# Patient Record
Sex: FEMALE | Race: WHITE | NOT HISPANIC OR LATINO | Employment: OTHER | URBAN - METROPOLITAN AREA
[De-identification: names, ages, dates, MRNs, and addresses within clinical notes are randomized per-mention and may not be internally consistent; named-entity substitution may affect disease eponyms.]

---

## 2018-06-28 ENCOUNTER — APPOINTMENT (OUTPATIENT)
Dept: WOMENS IMAGING | Facility: HOSPITAL | Age: 58
End: 2018-06-28

## 2018-06-28 PROCEDURE — 77067 SCR MAMMO BI INCL CAD: CPT | Performed by: RADIOLOGY

## 2018-08-28 ENCOUNTER — RESULTS ENCOUNTER (OUTPATIENT)
Dept: ONCOLOGY | Facility: CLINIC | Age: 58
End: 2018-08-28

## 2018-08-28 ENCOUNTER — CONSULT (OUTPATIENT)
Dept: ONCOLOGY | Facility: CLINIC | Age: 58
End: 2018-08-28

## 2018-08-28 VITALS
WEIGHT: 150 LBS | SYSTOLIC BLOOD PRESSURE: 140 MMHG | HEART RATE: 104 BPM | BODY MASS INDEX: 26.58 KG/M2 | HEIGHT: 63 IN | DIASTOLIC BLOOD PRESSURE: 69 MMHG | RESPIRATION RATE: 16 BRPM | TEMPERATURE: 98.2 F

## 2018-08-28 DIAGNOSIS — C50.912 LOBULAR CARCINOMA OF LEFT BREAST (HCC): Primary | ICD-10-CM

## 2018-08-28 DIAGNOSIS — C50.912 LOBULAR CARCINOMA OF LEFT BREAST (HCC): ICD-10-CM

## 2018-08-28 PROCEDURE — 99245 OFF/OP CONSLTJ NEW/EST HI 55: CPT | Performed by: INTERNAL MEDICINE

## 2018-08-28 RX ORDER — TELMISARTAN 40 MG/1
TABLET ORAL DAILY
COMMUNITY
Start: 2018-06-20

## 2018-08-28 RX ORDER — TRIAMTERENE AND HYDROCHLOROTHIAZIDE 37.5; 25 MG/1; MG/1
CAPSULE ORAL
COMMUNITY
Start: 2018-04-10 | End: 2019-12-19 | Stop reason: ALTCHOICE

## 2018-08-28 RX ORDER — ACYCLOVIR 400 MG/1
1 TABLET ORAL DAILY PRN
COMMUNITY
Start: 2015-02-05

## 2018-08-28 RX ORDER — ACYCLOVIR 200 MG/1
CAPSULE ORAL
COMMUNITY
Start: 2018-01-22 | End: 2020-06-18 | Stop reason: ALTCHOICE

## 2018-08-28 RX ORDER — LEVOTHYROXINE SODIUM 75 UG/1
CAPSULE ORAL DAILY
COMMUNITY
Start: 2018-04-10 | End: 2019-06-13 | Stop reason: DRUGHIGH

## 2018-08-28 RX ORDER — ICOSAPENT ETHYL 1000 MG/1
CAPSULE ORAL
COMMUNITY
Start: 2018-07-15 | End: 2022-06-22

## 2018-08-28 RX ORDER — DEXLANSOPRAZOLE 60 MG/1
CAPSULE, DELAYED RELEASE ORAL DAILY
COMMUNITY
Start: 2018-04-10

## 2018-08-28 RX ORDER — MONTELUKAST SODIUM 10 MG/1
TABLET ORAL
COMMUNITY
Start: 2018-05-25

## 2018-08-28 RX ORDER — PRAVASTATIN SODIUM 40 MG
TABLET ORAL
COMMUNITY
Start: 2018-04-10

## 2018-08-28 RX ORDER — ASPIRIN 81 MG
TABLET, DELAYED RELEASE (ENTERIC COATED) ORAL DAILY
COMMUNITY

## 2018-08-28 RX ORDER — CYCLOBENZAPRINE HCL 10 MG
TABLET ORAL
COMMUNITY
Start: 2018-06-19 | End: 2022-12-07

## 2018-08-28 RX ORDER — NIFEDIPINE 30 MG/1
TABLET, FILM COATED, EXTENDED RELEASE ORAL
COMMUNITY
Start: 2018-07-16

## 2018-08-28 RX ORDER — CETIRIZINE HYDROCHLORIDE 10 MG/1
TABLET ORAL
COMMUNITY
Start: 2018-07-17

## 2018-08-28 RX ORDER — DESLORATADINE 5 MG/1
TABLET ORAL
COMMUNITY
Start: 2018-07-05

## 2018-08-28 RX ORDER — MELOXICAM 15 MG/1
TABLET ORAL
COMMUNITY
Start: 2018-04-10 | End: 2022-12-07

## 2018-08-28 RX ORDER — CICLESONIDE 37 UG/1
AEROSOL, METERED NASAL
COMMUNITY
Start: 2018-07-05

## 2018-08-28 RX ORDER — DICYCLOMINE HYDROCHLORIDE 10 MG/1
CAPSULE ORAL 2 TIMES DAILY
COMMUNITY
Start: 2014-07-22

## 2018-08-28 RX ORDER — GABAPENTIN 300 MG/1
CAPSULE ORAL
COMMUNITY
Start: 2018-08-17

## 2018-08-28 RX ORDER — LORAZEPAM 0.5 MG/1
TABLET ORAL
COMMUNITY
Start: 2018-08-17 | End: 2023-01-24 | Stop reason: SDUPTHER

## 2018-08-28 RX ORDER — CAPSAICIN 0.025 %
CREAM (GRAM) TOPICAL EVERY 8 HOURS SCHEDULED
COMMUNITY
End: 2023-02-20

## 2018-08-28 RX ORDER — EMPAGLIFLOZIN 25 MG/1
TABLET, FILM COATED ORAL
COMMUNITY
Start: 2018-07-15 | End: 2019-12-19 | Stop reason: ALTCHOICE

## 2018-08-30 ENCOUNTER — APPOINTMENT (OUTPATIENT)
Dept: LAB | Facility: HOSPITAL | Age: 58
End: 2018-08-30

## 2018-08-30 ENCOUNTER — TELEPHONE (OUTPATIENT)
Dept: ONCOLOGY | Facility: CLINIC | Age: 58
End: 2018-08-30

## 2018-08-30 ENCOUNTER — CLINICAL SUPPORT (OUTPATIENT)
Dept: GENETICS | Facility: HOSPITAL | Age: 58
End: 2018-08-30

## 2018-08-30 DIAGNOSIS — C50.912 MALIGNANT NEOPLASM OF LEFT BREAST IN FEMALE, ESTROGEN RECEPTOR POSITIVE, UNSPECIFIED SITE OF BREAST (HCC): Primary | ICD-10-CM

## 2018-08-30 DIAGNOSIS — Z13.79 GENETIC TESTING: Primary | ICD-10-CM

## 2018-08-30 DIAGNOSIS — Z80.3 FAMILY HISTORY OF BREAST CANCER: ICD-10-CM

## 2018-08-30 DIAGNOSIS — Z80.0 FAMILY HISTORY OF PANCREATIC CANCER: ICD-10-CM

## 2018-08-30 DIAGNOSIS — Z17.0 MALIGNANT NEOPLASM OF LEFT BREAST IN FEMALE, ESTROGEN RECEPTOR POSITIVE, UNSPECIFIED SITE OF BREAST (HCC): Primary | ICD-10-CM

## 2018-08-30 LAB
BASOPHILS # BLD AUTO: 0 X10E3/UL (ref 0–0.2)
BASOPHILS NFR BLD AUTO: 0 %
EOSINOPHIL # BLD AUTO: 0.2 X10E3/UL (ref 0–0.4)
EOSINOPHIL NFR BLD AUTO: 2 %
ERYTHROCYTE [DISTWIDTH] IN BLOOD BY AUTOMATED COUNT: 13.5 % (ref 12.3–15.4)
HCT VFR BLD AUTO: 46.4 % (ref 34–46.6)
HGB BLD-MCNC: 16 G/DL (ref 11.1–15.9)
IMM GRANULOCYTES # BLD: 0 X10E3/UL (ref 0–0.1)
IMM GRANULOCYTES NFR BLD: 0 %
LYMPHOCYTES # BLD AUTO: 3.1 X10E3/UL (ref 0.7–3.1)
LYMPHOCYTES NFR BLD AUTO: 27 %
MCH RBC QN AUTO: 30.9 PG (ref 26.6–33)
MCHC RBC AUTO-ENTMCNC: 34.5 G/DL (ref 31.5–35.7)
MCV RBC AUTO: 90 FL (ref 79–97)
MONOCYTES # BLD AUTO: 0.7 X10E3/UL (ref 0.1–0.9)
MONOCYTES NFR BLD AUTO: 6 %
NEUTROPHILS # BLD AUTO: 7.6 X10E3/UL (ref 1.4–7)
NEUTROPHILS NFR BLD AUTO: 65 %
PLATELET # BLD AUTO: 319 X10E3/UL (ref 150–379)
RBC # BLD AUTO: 5.18 X10E6/UL (ref 3.77–5.28)
WBC # BLD AUTO: 11.7 X10E3/UL (ref 3.4–10.8)

## 2018-08-30 PROCEDURE — 96040: CPT | Performed by: GENETIC COUNSELOR, MS

## 2018-08-30 NOTE — TELEPHONE ENCOUNTER
----- Message from Ruth Mayers RN sent at 8/30/2018 10:46 AM EDT -----  Regarding: FW: [id number]  Contact: [phone]      ----- Message -----  From: Le Graves  Sent: 8/30/2018  10:31 AM  To: [facility]  Subject: STEVIE-BOZENA Draper with [facility] called and they one extra serum gel tube and she needs to know if it's extra or another test needs to be added that got missed.

## 2018-08-30 NOTE — TELEPHONE ENCOUNTER
Called [facility] to find out why they have an extra serum tube. They stated they did not have an order for CMP on patient. I informed her there was an order for a CBC & CMP and I will fax it again if necessary.

## 2018-09-04 NOTE — PROGRESS NOTES
Sigrid Holder, a 57-year-old female, was referred for genetic counseling due to a personal history of breast cancer. Ms. Holder was recently diagnosed with an ER/NC positive invasive lobular carcinoma of the left breast at age 57. She recently underwent a lumpectomy. She is planning to pursue radiation.  She had a total hysterectomy in her 40s. She was interested in discussing her risk for a hereditary cancer syndrome.  Ms. Holder was interested in pursuing a multi-gene panel to evaluate her risk of cancer, therefore the CancerNext panel was ordered through [email or web address] which analyzes BRCA1/2 and 32 additional genes associated with an increased cancer risk. The genes on this panel include APC, HERNANDEZ, BARD1, BMPR1A, BRCA1, BRCA2, BRIP1, CDH1, CDK4, CDKN2A, CHEK2, DICER1, EPCAM, GREM1, HOXB13, MLH1, MRE11A, MSH2, MSH6, MUTYH, NBN, NF1, PALB2, PMS2, POLD1, POLE, PTEN, RAD50, RAD51C, RAD51D, SMAD4, SMARCA4, STK11, and TP53. Results are expected within 2-3 weeks.     PERTINENT FAMILY HISTORY: (See attached pedigree)   Mother: Breast cancer, 30  Sister:  Pancreatic cancer, 51  Unknown paternal family history     We do not have medical records regarding any of these diagnoses.      RISK ASSESSMENT:  Ms. Holder’ personal and family history of cancer raises the question of a hereditary cancer syndrome. We discussed BRCA1/2 testing as well as the option of pursuing a panel that would test for other genes known to impact cancer risk in addition to BRCA1/2.   Ms. Holder clearly meets NCCN guidelines criteria for BRCA1/2 testing based on her personal history and family history of breast cancer and pancreatic cancer. These risk assessments are based on the family history information provided at the time of the appointment, and could change in the future should new information be obtained.    GENETIC COUNSELING (30 minutes):  We reviewed the family history information in detail. Cases of cancer follow three general patterns:  sporadic, familial, and hereditary.  While most cancer is sporadic, some cases appear to occur in family clusters.  These cases are said to be familial and account for 10-20% of cancer cases.  Familial cases may be due to a combination of shared genes and environmental factors among family members.  In even fewer families, the cancer is said to be inherited, and the genes responsible for the cancer are known.      Family histories typical of hereditary cancer syndromes usually include multiple first- and second-degree relatives diagnosed with cancer types that define a syndrome.  These cases tend to be diagnosed at younger-than-expected ages and can be bilateral or multifocal.  The cancer in these families follows an autosomal dominant inheritance pattern, which indicates the likely presence of a mutation in a cancer susceptibility gene.  Children and siblings of an individual believed to carry this mutation have a 50% chance of inheriting that mutation, thereby inheriting the increased risk to develop cancer.  These mutations can be passed down from the maternal or the paternal lineage.    Hereditary breast cancer accounts for 5-10% of all cases of breast cancer.  A significant proportion of hereditary breast and ovarian cancer can be attributed to mutations in the BRCA1 and BRCA2 genes.  Mutations in these genes confer an increased risk for breast cancer, ovarian cancer, male breast cancer, prostate cancer, and pancreatic cancer. Women with a BRCA1 or BRCA2 mutation who have already been diagnosed with breast cancer have a 40-60% lifetime risk of a second breast cancer. Women with a BRCA1 or BRCA2 mutation have up to a 44% risk of ovarian cancer.      There are other genes that are known to be associated with an increased risk for cancer.  Some of these genes have well defined cancer risks and established management guidelines.  Other genes that can be tested for have been more recently described, and there may be  less data regarding the risks and therefore may not have established management guidelines. We discussed these limitations at length.  Based on Ms. Holder’ desire to get as much information as possible regarding her personal risks and potential risks for her family, she opted to pursue testing through a panel that would look at several other genes known to increase the risk for cancer.    GENETIC TESTING:  The risks, benefits and limitations of genetic testing and implications for clinical management following testing were reviewed.  DNA test results can influence decisions regarding screening, prevention and surgical management.  Genetic testing can have significant psychological implications for both individuals and families.  Also discussed was the possibility of employment and insurance discrimination based on genetic test results and the laws in place to prevent this (BANG).    We discussed panel testing, which would involve testing for BRCA1/2 as well as 32 additional genes that are associated with increased cancer risk. The benefits and limitations of genetic testing were discussed and Ms. Holder decided to pursue testing via the panel. The implications of a positive or negative test result were discussed. We discussed the possibility that, in some cases, genetic test results may be informative or may be ambiguous due to the identification of a genetic variant. These variants may or may not be associated with an increased cancer risk.  With multigene panel testing, it is not uncommon for a variant of uncertain significance (VUS) to be identified.  If a VUS is identified, testing family members is typically not recommended and screening recommendations are made based on the family history.  The laboratories that perform genetic testing work to reclassify the VUS and send out an amended report if and when a VUS is reclassified.  The majority of variant findings are ultimately reclassified to a negative result.   Given her personal and family history, a negative test result would not eliminate all cancer risk to her relatives, although the risk would not be as high as it would with positive genetic testing.      PLAN: Genetic testing via the CancerNext panel through [employer] was ordered and results are expected within 2-3 weeks. Ms. Holder is welcome to contact us in the meantime with any questions she may have.      Luzmaria Pryor MS, [facility], C  Licensed Certified Genetic Counselor

## 2018-09-10 ENCOUNTER — HOSPITAL ENCOUNTER (OUTPATIENT)
Dept: MRI IMAGING | Facility: HOSPITAL | Age: 58
Discharge: HOME | End: 2018-09-10
Attending: INTERNAL MEDICINE

## 2018-09-10 DIAGNOSIS — C50.912 LOBULAR CARCINOMA OF LEFT BREAST (HCC): ICD-10-CM

## 2018-09-10 LAB — CREAT BLDA-MCNC: 0.6 MG/DL (ref 0.6–1.3)

## 2018-09-10 PROCEDURE — 82565 ASSAY OF CREATININE: CPT

## 2018-09-11 ENCOUNTER — HOSPITAL ENCOUNTER (OUTPATIENT)
Dept: MRI IMAGING | Facility: HOSPITAL | Age: 58
Discharge: HOME | End: 2018-09-11
Attending: INTERNAL MEDICINE | Admitting: INTERNAL MEDICINE

## 2018-09-11 PROCEDURE — 0159T PR BREAST MRI, COMPUTER AIDED DETECTION: CPT | Performed by: RADIOLOGY

## 2018-09-11 PROCEDURE — C8908 MRI W/O FOL W/CONT, BREAST,: HCPCS

## 2018-09-11 PROCEDURE — A9577 INJ MULTIHANCE: HCPCS | Performed by: INTERNAL MEDICINE

## 2018-09-11 PROCEDURE — 77059 MRI BREAST BILATERAL DIAGNOSTIC W WO CONTRAST: CPT | Performed by: RADIOLOGY

## 2018-09-11 PROCEDURE — 0 GADOBENATE DIMEGLUMINE 529 MG/ML SOLUTION: Performed by: INTERNAL MEDICINE

## 2018-09-11 RX ADMIN — GADOBENATE DIMEGLUMINE 14 ML: 529 INJECTION, SOLUTION INTRAVENOUS at 14:47

## 2018-09-12 ENCOUNTER — DOCUMENTATION (OUTPATIENT)
Dept: GENETICS | Facility: HOSPITAL | Age: 58
End: 2018-09-12

## 2018-09-12 ENCOUNTER — TELEPHONE (OUTPATIENT)
Dept: MRI IMAGING | Facility: HOSPITAL | Age: 58
End: 2018-09-12

## 2018-09-12 NOTE — PROGRESS NOTES
Sigrid Holder, a 57-year-old female, was referred for genetic counseling due to a personal history of breast cancer. Ms. Holder was recently diagnosed with an ER/NC positive invasive lobular carcinoma of the left breast at age 57. She recently underwent a lumpectomy. She had a total hysterectomy in her 40s. She was interested in discussing her risk for a hereditary cancer syndrome.  Ms. Holder was interested in pursuing a multi-gene panel to evaluate her risk of cancer, therefore the CancerNext panel was ordered through [employer] which analyzes BRCA1/2 and 32 additional genes associated with an increased cancer risk. The genes on this panel include APC, HERNANDEZ, BARD1, BMPR1A, BRCA1, BRCA2, BRIP1, CDH1, CDK4, CDKN2A, CHEK2, DICER1, EPCAM, GREM1, HOXB13, MLH1, MRE11A, MSH2, MSH6, MUTYH, NBN, NF1, PALB2, PMS2, POLD1, POLE, PTEN, RAD50, RAD51C, RAD51D, SMAD4, SMARCA4, STK11, and TP53.  Genetic testing was positive for a pathogenic mutation in the BRCA2 gene (see attached results). These results were discussed with Ms. Holder and her  by telephone on 9/11/18. We had a thorough discussion about these results and its implications for herself and family members. She is planning to come in for a follow up visit when it can be coordinated with another appointment at [facility].     PERTINENT FAMILY HISTORY: (See attached pedigree)   Mother:  Breast cancer, 30  Sister:  Pancreatic cancer, 51  Unknown paternal family history     We do not have medical records regarding any of these diagnoses.      RISK ASSESSMENT:  Ms. Holder’ personal and family history of cancer raises the question of a hereditary cancer syndrome. We discussed BRCA1/2 testing as well as the option of pursuing a panel that would test for other genes known to impact cancer risk in addition to BRCA1/2.   Ms. Holder clearly meets NCCN guidelines criteria for BRCA1/2 testing based on her personal history and family history of breast cancer and  pancreatic cancer. These risk assessments are based on the family history information provided at the time of the appointment, and could change in the future should new information be obtained.    GENETIC COUNSELING:  We reviewed the family history information in detail. Cases of cancer follow three general patterns: sporadic, familial, and hereditary.  While most cancer is sporadic, some cases appear to occur in family clusters.  These cases are said to be familial and account for 10-20% of cancer cases.  Familial cases may be due to a combination of shared genes and environmental factors among family members.  In even fewer families, the cancer is said to be inherited, and the genes responsible for the cancer are known.      Family histories typical of hereditary cancer syndromes usually include multiple first- and second-degree relatives diagnosed with cancer types that define a syndrome.  These cases tend to be diagnosed at younger-than-expected ages and can be bilateral or multifocal.  The cancer in these families follows an autosomal dominant inheritance pattern, which indicates the likely presence of a mutation in a cancer susceptibility gene.  Children and siblings of an individual believed to carry this mutation have a 50% chance of inheriting that mutation, thereby inheriting the increased risk to develop cancer.  These mutations can be passed down from the maternal or the paternal lineage.    Hereditary breast cancer accounts for 5-10% of all cases of breast cancer.  A significant proportion of hereditary breast and ovarian cancer can be attributed to mutations in the BRCA1 and BRCA2 genes.  Mutations in these genes confer an increased risk for breast cancer, ovarian cancer, male breast cancer, prostate cancer, and pancreatic cancer. Women with a BRCA1 or BRCA2 mutation who have already been diagnosed with breast cancer have a 40-60% lifetime risk of a second breast cancer. Women with a BRCA1 or BRCA2  mutation have up to a 44% risk of ovarian cancer.      There are other genes that are known to be associated with an increased risk for cancer.  Some of these genes have well defined cancer risks and established management guidelines.  Other genes that can be tested for have been more recently described, and there may be less data regarding the risks and therefore may not have established management guidelines. We discussed these limitations at length.  Based on Ms. Holder’ desire to get as much information as possible regarding her personal risks and potential risks for her family, she opted to pursue testing through a panel that would look at several other genes known to increase the risk for cancer.    GENETIC TESTING:  The risks, benefits and limitations of genetic testing and implications for clinical management following testing were reviewed.  DNA test results can influence decisions regarding screening, prevention and surgical management.  Genetic testing can have significant psychological implications for both individuals and families.  Also discussed was the possibility of employment and insurance discrimination based on genetic test results and the laws in place to prevent this (BANG).    We discussed panel testing, which would involve testing for BRCA1/2 as well as 32 additional genes that are associated with increased cancer risk. The benefits and limitations of genetic testing were discussed and Ms. Holder decided to pursue testing via the panel. The implications of a positive or negative test result were discussed. We discussed the possibility that, in some cases, genetic test results may be informative or may be ambiguous due to the identification of a genetic variant. These variants may or may not be associated with an increased cancer risk.  With multigene panel testing, it is not uncommon for a variant of uncertain significance (VUS) to be identified.  If a VUS is identified, testing family members  is typically not recommended and screening recommendations are made based on the family history.  The laboratories that perform genetic testing work to reclassify the VUS and send out an amended report if and when a VUS is reclassified.  The majority of variant findings are ultimately reclassified to a negative result.  Given her personal and family history, a negative test result would not eliminate all cancer risk to her relatives, although the risk would not be as high as it would with positive genetic testing.    TEST RESULTS: Testing identified a pathogenic mutation in the BRCA2 gene ([id number]). This is causative of Hereditary Breast and Ovarian Cancer (HBOC). Genetic testing for the specific BRCA2 mutation is indicated for other family members to determine whether they have inherited this risk factor.  Based on the family history information available, this BRCA2 mutation was most likely inherited from Ms. Holder’ maternal side of the family.     Until each at-risk family member has been proven not to carry this mutation in BRCA2, he or she should be offered increased surveillance and chemoprevention.  We would be happy to see family members who live in the [facility] in our clinic to further discuss this information and testing options.  They can call our office at [phone] to schedule an appointment.  For family members who live elsewhere, there are genetic counselors at most [facility] centers.  They can find a genetic counselor by visiting the National Society of Genetic Counselors website at www.nsgc.org or they can call our office and we would be happy to give them the contact information of the closest genetic counselor.        CANCER RISK: Mutations in BRCA2 confer a 56-84% lifetime risk of breast cancer, a 10-27% lifetime risk of ovarian cancer and a 40-60% lifetime risk of a second breast cancer in someone who has already been diagnosed with breast cancer. Additionally, there is as high  as a 7% risk for male breast cancer and a 20% risk for prostate cancer.  BRCA2 mutations have also been associated with increased risks for other types of cancer, including pancreatic cancer (up to 7% lifetime risk), and ocular and cutaneous melanoma (<5% lifetime risk).    CANCER SCREENING:  Options available to individuals with a BRCA2 mutation and at high risk for breast and ovarian cancer were discussed, including increased surveillance, chemoprevention and prophylactic surgery (mastectomy and/or oophorectomy).      For women with a BRCA2 mutation who choose not to undergo prophylactic bilateral mastectomy, increased breast cancer surveillance is warranted. Increased surveillance, based on NCCN guidelines, would consist of semi-annual clinical breast exam and monthly self-breast exam starting at age 18 and annual mammography and breast MRI starting at age 25. Breast cancer chemoprevention is another option that Ms. Holder’ relatives may wish to consider in the future.  Studies have shown that Tamoxifen and Raloxifene can cut the risk of estrogen receptor positive breast cancer by 50% when taken by high-risk women.  There are risks associated with these medications; therefore, the risks versus benefits must be considered prior to deciding to take chemopreventative medications.     Current data does not support routine ovarian cancer screening.  We briefly discussed transvaginal ultrasound and serum CA-125, however neither has been found to be sufficiently sensitive or specific to be recommended for individuals that have an increased risk for ovarian cancer.  Some physicians consider offering screening between ages 30-35, before a patient is at an age where BSO is typically offered.      Screening for males with BRCA2 mutations should include prostate cancer screening beginning at age 40-45 and self-breast exam.  Mammography can be considered in males with gynecomastia.  Given the risk for melanoma, annual skin and  eye examinations could be considered for both males and females.  The importance of monthly self-skin exams was emphasized, as well as diligence in following up with a clinician when a mole or suspicious skin lesion is identified. There are no standardized screening tests that have been proven to be effective in early pancreatic cancer detection.  In the absence of a family history, there are not typically screening recommendations made.  In cases where an individual has a BRCA2 mutation and family history of pancreatic cancer some experts consider screening with endoscopic ultrasound, high-resolution pancreatic protocol CT, or MRI, starting at age 50 or 10 years younger than the earliest diagnosis of pancreas cancer in the family.  Since these screening methods are not standard of care, consideration of referral to a clinical research screening program is appropriate if a patient wishes to pursue screening.      SURGICAL OPTIONS:  Risk-reducing bilateral mastectomy has been shown to reduce the risk of breast cancer by approximately 90%.  Risk-reducing BSO has been shown to reduce the risk of ovarian cancer by as much as 96%. It has been suggested that risk-reducing BSO in high-risk women be done by a surgeon with experience in this population, such as a gynecologic oncologist.  Careful removal of the fallopian tubes is essential, due to the residual risk for fallopian tube cancer in BRCA positive patients. Additionally, 2-10% of BRCA positive patients are found to have ovarian cancer at the time of BSO; therefore, careful pathologic exam of the ovaries by serial sectioning is recommended.  In addition, cytologic evaluation of peritoneal washings could be considered. Ms. Holder reports that she has had both ovaries removed in her 40s.     PLAN: We discussed these results in great detail and at length by telephone and she will contact us to coordinate a follow up appointment. Ms. Holder reports she may be interested in  pursuing bilateral mastectomy rather than moving forward with radiation at this time. She is scheduled for follow up with Dr. Coates on Friday 9/14/18. Ms. Holder is encouraged to contact us with any questions or concerns she may have. We would be happy to see or speak with her relatives to arrange counseling and testing for the familial BRCA2 mutation or to discuss their test results and surgical/screening options.      Luzmaria Pryor MS, [facility]  Licensed Certified Genetic Counselor       Cc: MD Minh Schumacher MD Anthony Barnes, MD

## 2018-09-12 NOTE — TELEPHONE ENCOUNTER
Called pt with Breast MRI results. Recommended yearly high risk screening. Pt to start Radiation. She states that her genetic testing came back positive. Pt encouraged to call with any further questions or concerns.

## 2018-09-14 ENCOUNTER — OFFICE VISIT (OUTPATIENT)
Dept: ONCOLOGY | Facility: CLINIC | Age: 58
End: 2018-09-14

## 2018-09-14 ENCOUNTER — RESULTS ENCOUNTER (OUTPATIENT)
Dept: ONCOLOGY | Facility: CLINIC | Age: 58
End: 2018-09-14

## 2018-09-14 VITALS
SYSTOLIC BLOOD PRESSURE: 130 MMHG | WEIGHT: 151 LBS | TEMPERATURE: 97.8 F | HEIGHT: 63 IN | BODY MASS INDEX: 26.75 KG/M2 | DIASTOLIC BLOOD PRESSURE: 74 MMHG | RESPIRATION RATE: 18 BRPM | HEART RATE: 101 BPM

## 2018-09-14 DIAGNOSIS — C50.912 LOBULAR CARCINOMA OF LEFT BREAST (HCC): ICD-10-CM

## 2018-09-14 DIAGNOSIS — C50.912 LOBULAR CARCINOMA OF LEFT BREAST (HCC): Primary | ICD-10-CM

## 2018-09-14 PROCEDURE — 99214 OFFICE O/P EST MOD 30 MIN: CPT | Performed by: INTERNAL MEDICINE

## 2018-09-14 RX ORDER — ANASTROZOLE 1 MG/1
1 TABLET ORAL DAILY
Qty: 30 TABLET | Refills: 11 | Status: SHIPPED | OUTPATIENT
Start: 2018-09-14 | End: 2018-12-12 | Stop reason: SDUPTHER

## 2018-09-14 NOTE — PROGRESS NOTES
CHIEF COMPLAINT: BRCA2 mutated breast cancer    Problem List:     Lobular carcinoma of left breast (CMS/HCC)    8/28/2018 Initial Diagnosis     Stage IA Lobular carcinoma of left breast (CMS/HCC): History of multiple prior mammographic abnormalities with biopsies benign.  More recent mammogram in July worrisome and biopsy consistent with invasive lobular carcinoma.  Underwent lumpectomy and sentinel node biopsy 8/8/18 with Dr. Forbes.  This showed a 1.2 cm infiltrating lobular carcinoma grade 2 Stover Terry 6 out of 7 with 0 out of 1 sentinel nodes involved and negative margins.  ER 95% 3+ positive MT 95% 3+ positive and HER-2/will 0+.  I saw for the first time 8/28/18 and ordered Oncotype and the recurrence score was 20 with a 10 year risk of recurrence distantly predicted at 13%.  Will need bilateral mastectomies and at least 5 years of Arimidex.  The only staging to perform would be CBC and CMP with this early stage carcinoma.  With lobular carcinoma and a history of multiple prior mammographic abnormalities which presently are considered benign, she will need close mammographic follow-up.  BRCA2 gene mutation came back positive.  I ordered a CBC which had a slightly elevated white count and hemoglobin but only marginally so.  They do not do CMP and I will order that at her second visit.  We'll get her back to Dr. Forbes for prophylactic bilateral mastectomies.            HISTORY OF PRESENT ILLNESS:  The patient is a 57 y.o. female, here for follow up on management of BRCA2 mutated breast cancer.  See above for her lobular carcinoma history of present illness.      Past Medical History:   Diagnosis Date   • Arthritis    • Diabetes mellitus (CMS/HCC)    • Disease of thyroid gland    • Hypertension      No past surgical history on file.    No Known Allergies    Family History and Social History reviewed and changed as necessary      REVIEW OF SYSTEM:   Review of Systems   Constitutional: Negative for  "appetite change, chills, diaphoresis, fatigue, fever and unexpected weight change.   HENT:   Negative for mouth sores, sore throat and trouble swallowing.    Eyes: Negative for icterus.   Respiratory: Negative for cough, hemoptysis and shortness of breath.    Cardiovascular: Negative for chest pain, leg swelling and palpitations.   Gastrointestinal: Negative for abdominal distention, abdominal pain, blood in stool, constipation, diarrhea, nausea and vomiting.   Endocrine: Negative for hot flashes.   Genitourinary: Negative for bladder incontinence, difficulty urinating, dysuria, frequency and hematuria.    Musculoskeletal: Negative for gait problem, neck pain and neck stiffness.   Skin: Negative for rash.   Neurological: Negative for dizziness, gait problem, headaches, light-headedness and numbness.   Hematological: Negative for adenopathy. Does not bruise/bleed easily.   Psychiatric/Behavioral: Negative for depression. The patient is not nervous/anxious.    All other systems reviewed and are negative.       PHYSICAL EXAM    Vitals:    09/14/18 1408   BP: 130/74   Pulse: 101   Resp: 18   Temp: 97.8 °F (36.6 °C)   Weight: 68.5 kg (151 lb)   Height: 159.4 cm (62.75\")     Constitutional: Appears well-developed and well-nourished. No distress.   ECOG: (0) Fully active, able to carry on all predisease performance without restriction  HENT:   Head: Normocephalic.   Mouth/Throat: Oropharynx is clear and moist.   Eyes: Conjunctivae are normal. Pupils are equal, round, and reactive to light. No scleral icterus.   Neck: Neck supple. No JVD present. No thyromegaly present.   Cardiovascular: Normal rate, regular rhythm and normal heart sounds.    Pulmonary/Chest: Breath sounds normal. No respiratory distress.   Abdominal: Soft. Exhibits no distension and no mass. There is no hepatosplenomegaly. There is no tenderness. There is no rebound and no guarding.   Musculoskeletal:Exhibits no edema, tenderness or deformity. "   Neurological: Alert and oriented to person, place, and time. Exhibits normal muscle tone.   Skin: No ecchymosis, no petechiae and no rash noted. Not diaphoretic. No cyanosis. Nails show no clubbing.   Psychiatric: Normal mood and affect.   Vitals reviewed.      Mri Breast Bilateral Diagnostic With & Without Contrast    Result Date: 9/11/2018  Benign bilateral breast MRI. Expected postsurgical changes are visualized in the left breast status post breast conservation surgery.  RECOMMENDATIONS: Annual high-risk screening breast MRI imaging.   BI-RADS CATEGORY: 2, BENIGN.  FINAL MRI RESULTS AND RECOMMENDATIONS WILL BE CALLED TO THE PATIENT BY OUR LEAD BREAST MRI TECHNOLOGIST.  This report was finalized on 9/11/2018 4:28 PM by Dr. Angeline Alvarado MD.              ASSESSMENT & PLAN:    1.  Stage IA Lobular carcinoma of left breast (CMS/HCC): History of multiple prior mammographic abnormalities with biopsies benign.  More recent mammogram in July worrisome and biopsy consistent with invasive lobular carcinoma.  Underwent lumpectomy and sentinel node biopsy 8/8/18 with Dr. Forbes.  This showed a 1.2 cm infiltrating lobular carcinoma grade 2 Stover Terry 6 out of 7 with 0 out of 1 sentinel nodes involved and negative margins.  ER 95% 3+ positive [place] 95% 3+ positive and HER-2/will 0+.  I saw for the first time 8/28/18 and ordered Oncotype and the recurrence score was 20 with a 10 year risk of recurrence distantly predicted at 13%.    The only staging to perform would be CBC and CMP with this early stage carcinoma.  With lobular carcinoma and a history of multiple prior mammographic abnormalities which presently are considered benign, she will need close mammographic follow-up.  BRCA2 gene mutation came back positive.  I ordered a CBC which had a slightly elevated white count and hemoglobin but only marginally so.  They did not do CMP and I will order that at her second visit.  Her CT of her abdomen was negative.  Will  need bilateral mastectomies and at least 5 years of Arimidex. We'll get her back to Dr. Forbes for prophylactic bilateral mastectomies versus serial MRIs and mammograms following radiation.  If she has mastectomies, obviously she would not need radiation.  She will see my nurse practitioner back in 3 months for survivorship visit.  Discussed with patient 30 minutes greater than 50% spent in counseling.  Addendum: As she was leaving she mentions she still hurting in the right upper quadrant even though her CT was negative of the abdomen.  I told her we get a bone scan and if that's negative to discuss with Dr. Forbes about doing colonoscopy screening as well.    Lopez Coates MD    09/14/2018

## 2018-09-16 LAB
ALBUMIN SERPL-MCNC: 5.1 G/DL (ref 3.5–5.5)
ALBUMIN/GLOB SERPL: 1.7 {RATIO} (ref 1.2–2.2)
ALP SERPL-CCNC: 179 IU/L (ref 39–117)
ALT SERPL-CCNC: 72 IU/L (ref 0–32)
AST SERPL-CCNC: 49 IU/L (ref 0–40)
BILIRUB SERPL-MCNC: 0.3 MG/DL (ref 0–1.2)
BUN SERPL-MCNC: 18 MG/DL (ref 6–24)
BUN/CREAT SERPL: 28 (ref 9–23)
CALCIUM SERPL-MCNC: 10.2 MG/DL (ref 8.7–10.2)
CHLORIDE SERPL-SCNC: 95 MMOL/L (ref 96–106)
CO2 SERPL-SCNC: 20 MMOL/L (ref 20–29)
CREAT SERPL-MCNC: 0.65 MG/DL (ref 0.57–1)
GLOBULIN SER CALC-MCNC: 3 G/DL (ref 1.5–4.5)
GLUCOSE SERPL-MCNC: 169 MG/DL (ref 65–99)
POTASSIUM SERPL-SCNC: 4 MMOL/L (ref 3.5–5.2)
PROT SERPL-MCNC: 8.1 G/DL (ref 6–8.5)
SODIUM SERPL-SCNC: 139 MMOL/L (ref 134–144)

## 2018-09-21 ENCOUNTER — HOSPITAL ENCOUNTER (OUTPATIENT)
Dept: NUCLEAR MEDICINE | Facility: HOSPITAL | Age: 58
Discharge: HOME | End: 2018-09-21
Attending: INTERNAL MEDICINE

## 2018-09-21 DIAGNOSIS — C50.912 LOBULAR CARCINOMA OF LEFT BREAST (HCC): ICD-10-CM

## 2018-09-21 LAB
ALBUMIN SERPL-MCNC: 5 G/DL (ref 3.5–5.5)
ALBUMIN/GLOB SERPL: 1.7 {RATIO} (ref 1.2–2.2)
ALP SERPL-CCNC: 162 IU/L (ref 39–117)
ALT SERPL-CCNC: 54 IU/L (ref 0–32)
AST SERPL-CCNC: 30 IU/L (ref 0–40)
BILIRUB SERPL-MCNC: <0.2 MG/DL (ref 0–1.2)
BUN SERPL-MCNC: 14 MG/DL (ref 6–24)
BUN/CREAT SERPL: 19 (ref 9–23)
CALCIUM SERPL-MCNC: 10.7 MG/DL (ref 8.7–10.2)
CHLORIDE SERPL-SCNC: 95 MMOL/L (ref 96–106)
CO2 SERPL-SCNC: 22 MMOL/L (ref 20–29)
CREAT SERPL-MCNC: 0.72 MG/DL (ref 0.57–1)
GLOBULIN SER CALC-MCNC: 2.9 G/DL (ref 1.5–4.5)
GLUCOSE SERPL-MCNC: 204 MG/DL (ref 65–99)
POTASSIUM SERPL-SCNC: 4.3 MMOL/L (ref 3.5–5.2)
PROT SERPL-MCNC: 7.9 G/DL (ref 6–8.5)
SODIUM SERPL-SCNC: 141 MMOL/L (ref 134–144)
WRITTEN AUTHORIZATION: NORMAL

## 2018-09-21 PROCEDURE — A9503 TC99M MEDRONATE: HCPCS | Performed by: INTERNAL MEDICINE

## 2018-09-21 PROCEDURE — 78306 BONE IMAGING WHOLE BODY: CPT

## 2018-09-21 PROCEDURE — 0 TECHNETIUM MEDRONATE KIT: Performed by: INTERNAL MEDICINE

## 2018-09-21 RX ORDER — TC 99M MEDRONATE 20 MG/10ML
25.5 INJECTION, POWDER, LYOPHILIZED, FOR SOLUTION INTRAVENOUS
Status: COMPLETED | OUTPATIENT
Start: 2018-09-21 | End: 2018-09-21

## 2018-09-21 RX ADMIN — Medication 25.5 MILLICURIE: at 09:40

## 2018-12-12 ENCOUNTER — CLINICAL SUPPORT (OUTPATIENT)
Dept: ONCOLOGY | Facility: CLINIC | Age: 58
End: 2018-12-12

## 2018-12-12 VITALS
DIASTOLIC BLOOD PRESSURE: 72 MMHG | SYSTOLIC BLOOD PRESSURE: 137 MMHG | HEART RATE: 97 BPM | BODY MASS INDEX: 25.52 KG/M2 | WEIGHT: 144 LBS | HEIGHT: 63 IN | TEMPERATURE: 98.5 F | RESPIRATION RATE: 16 BRPM

## 2018-12-12 DIAGNOSIS — Z15.02 BRCA2 GENE MUTATION POSITIVE IN FEMALE: ICD-10-CM

## 2018-12-12 DIAGNOSIS — Z15.09 BRCA2 GENE MUTATION POSITIVE IN FEMALE: ICD-10-CM

## 2018-12-12 DIAGNOSIS — Z15.01 BRCA2 GENE MUTATION POSITIVE IN FEMALE: ICD-10-CM

## 2018-12-12 DIAGNOSIS — R94.8 ABNORMAL RADIONUCLIDE BONE SCAN: ICD-10-CM

## 2018-12-12 DIAGNOSIS — C50.912 LOBULAR CARCINOMA OF LEFT BREAST (HCC): Primary | ICD-10-CM

## 2018-12-12 PROCEDURE — 99215 OFFICE O/P EST HI 40 MIN: CPT | Performed by: NURSE PRACTITIONER

## 2018-12-12 RX ORDER — ANASTROZOLE 1 MG/1
1 TABLET ORAL DAILY
Qty: 90 TABLET | Refills: 3 | Status: SHIPPED | OUTPATIENT
Start: 2018-12-12

## 2018-12-12 RX ORDER — SERTRALINE HYDROCHLORIDE 25 MG/1
1 TABLET, FILM COATED ORAL DAILY
COMMUNITY
Start: 2018-09-19 | End: 2023-01-24 | Stop reason: SDUPTHER

## 2018-12-12 NOTE — PROGRESS NOTES
MEDICAL ONCOLOGY CANCER SURVIVORSHIP VISIT    Sigrid Holder  [id number]  1960    Chief Complaint:   Follow-up for left breast cancer    History of present illness:  Sigrid Holder is a 58 y.o. year old female who is here today for the Cancer Survivorship visit, see oncology history below.  The patient was diagnosed with stage IA left breast cancer July 2018.  Follow-up genetic testing revealed BRCA2 mutation.  Breast cancer was estrogen receptor positive, she started Arimidex September 2018.  She underwent prophylactic bilateral mastectomies October 1, 2018.  Has a history of previous total colectomy with removal of ovaries in her 40s.  She is tolerating Arimidex with no side effects that she is aware of.       Cancer History:      Lobular carcinoma of left breast (CMS/HCC)    8/28/2018 Initial Diagnosis     Stage IA Lobular carcinoma of left breast (CMS/HCC): History of multiple prior mammographic abnormalities with biopsies benign.  More recent mammogram in July worrisome and biopsy consistent with invasive lobular carcinoma.  Underwent lumpectomy and sentinel node biopsy 8/8/18 with Dr. Forbes.  This showed a 1.2 cm infiltrating lobular carcinoma grade 2 Stover Terry 6 out of 7 with 0 out of 1 sentinel nodes involved and negative margins.  ER 95% 3+ positive MD 95% 3+ positive and HER-2/will 0+.  Stage 1a, T1c N0 M0.  I saw for the first time 8/28/18 and ordered Oncotype and the recurrence score was 20 with a 10 year risk of recurrence distantly predicted at 13%.  Will need bilateral mastectomies and at least 5 years of Arimidex.  The only staging to perform would be CBC and CMP with this early stage carcinoma.  With lobular carcinoma and a history of multiple prior mammographic abnormalities which presently are considered benign, she will need close mammographic follow-up.  BRCA2 gene mutation came back positive.  She has had prophylactic bilateral mastectomies with Dr. Forbes  10/1/2018.  Personal history of having her ovaries removed in her 40's.          9/7/2018 Imaging     CT abdomen and pelvis showed lumpectomy site and fatty liver but no abnormal masses or adenopathy or metastases.         9/14/2018 -  Hormonal Therapy     Arimidex         9/14/2018 -  Other Event     CMP glucose 169, BUN 18, creatinine 0.65, potassium 4.0, AST 49, ALT 72, alkaline phosphatase 179.           9/21/2018 Imaging     Total body bone scan IMPRESSION:  1. Low-level changes typical of DJD of the thoracic spine, AC and SC  joints.  2. Small focus of low-level activity in the left ischium, which may also  be benign. Particularly if the patient has symptoms in this region,  consider dedicated imaging.         10/1/2018 Surgery     Surgery       Procedure:  Bilateral total mastectomies                 Past Medical History:   Diagnosis Date   • Arthritis    • Diabetes mellitus (CMS/HCC)    • Disease of thyroid gland    • Hypertension        History reviewed. No pertinent surgical history.    MEDICATIONS: The current medication list was reviewed and reconciled.     Allergies:  has No Known Allergies.    Family History   Problem Relation Age of Onset   • Breast cancer Mother    • Pancreatic cancer Sister          Review of Systems   Constitutional: Negative for fatigue, fever and unexpected weight change.   HENT: Negative for congestion, hearing loss, sore throat and trouble swallowing.    Eyes: Negative for visual disturbance.   Respiratory: Negative for cough, shortness of breath and wheezing.    Cardiovascular: Negative for chest pain and leg swelling.   Gastrointestinal: Negative for abdominal distention, abdominal pain, constipation, diarrhea, nausea and vomiting.   Endocrine: Negative.    Genitourinary: Negative.    Musculoskeletal: Negative for arthralgias, back pain and gait problem.   Skin: Negative.    Allergic/Immunologic: Negative.    Neurological: Negative for dizziness, weakness, numbness and  "headaches.   Hematological: Negative for adenopathy. Does not bruise/bleed easily.   Psychiatric/Behavioral: Negative.    All other systems reviewed and are negative.      Physical Exam  Vital Signs: /72   Pulse 97   Temp 98.5 °F (36.9 °C)   Resp 16   Ht 159.4 cm (62.75\")   Wt 65.3 kg (144 lb)   BMI 25.71 kg/m²    General Appearance:  alert, cooperative, no apparent distress, appears stated age and normal weight   Neurologic/Psychiatric: A&O x 3, gait steady, appropriate affect   HEENT:  Normocephalic, without obvious abnormality, mucous membranes moist   Neck: Supple, symmetrical, trachea midline, no adenopathy;  No thyromegaly, masses, or tenderness   Chest:   Chest wall exam benign status post bilateral mastectomies    Lungs:   Clear to auscultation bilaterally; respirations regular, even, and unlabored bilaterally   Heart:  Regular rate and rhythm, no murmurs appreciated   Abdomen:   Soft, non-tender and non-distended   Lymph nodes: No cervical or supraclavicular adenopathy noted   Extremities: Normal, atraumatic; no clubbing, cyanosis, or edema      ECOG Performance Status: (0) Fully active, able to carry on all predisease performance without restriction        Assessment and Plan:  Diagnoses and all orders for this visit:    Lobular carcinoma of left breast (CMS/HCC)  -     XR Pelvis 1 or 2 View; Future    BRCA2 gene mutation positive in female  -     Ambulatory Referral to Dermatology    Abnormal radionuclide bone scan  -     XR Pelvis 1 or 2 View; Future    Other orders  -     anastrozole (ARIMIDEX) 1 MG tablet; Take 1 tablet by mouth Daily.        Discussion:    The patient and I have reviewed alda personal Survivorship Care Plan in detail. We discussed diagnosis, pathology, histology, all treatments, and ongoing surveillance recommendations. All questions were answered to her satisfaction. The patient is in agreement with our plan for ongoing surveillance as outlined in the plan. A copy of " this document was provided at the completion of our visit.  A copy has also been sent to the patient's primary care provider.    In light of her BRCA2 gene mutation positivity I will get her to dermatology as she will need annual skin checks.  She is up-to-date on colonoscopy with her last one done in October 2017.  She has had bilateral prophylactic mastectomies and previous removal of her ovaries in her 40s with her hysterectomy.  We plan on at least 5 years adjuvant therapy with Arimidex.  She will need periodic bone density testing, she is not sure if she has never had that done.  She is going to check with Dr. Partida at her next follow-up.    Total body bone scan in September showed low-level changes typical of DJD of the thoracic spine, AC and SC joints.  There was a small focus of low-level activity in the left ischium which may also be benign.  I will get an x-ray of her pelvis for further evaluation and call her with those results.  She is not having any pain or symptoms in this area.    This was a 45 minute visit with 40 minutes spent in direct face to face review of the Survivorship Care Plan.    Return to clinic in 6 months for ongoing cancer surveillance.      REGINA Petersen

## 2019-01-08 ENCOUNTER — HOSPITAL ENCOUNTER (OUTPATIENT)
Dept: GENERAL RADIOLOGY | Facility: HOSPITAL | Age: 59
Discharge: HOME | End: 2019-01-08
Admitting: NURSE PRACTITIONER

## 2019-01-08 DIAGNOSIS — C50.912 LOBULAR CARCINOMA OF LEFT BREAST (HCC): ICD-10-CM

## 2019-01-08 DIAGNOSIS — R94.8 ABNORMAL RADIONUCLIDE BONE SCAN: ICD-10-CM

## 2019-01-08 PROCEDURE — 72170 X-RAY EXAM OF PELVIS: CPT

## 2019-06-13 ENCOUNTER — OFFICE VISIT (OUTPATIENT)
Dept: ONCOLOGY | Facility: CLINIC | Age: 59
End: 2019-06-13

## 2019-06-13 VITALS
TEMPERATURE: 98.2 F | HEART RATE: 81 BPM | HEIGHT: 63 IN | DIASTOLIC BLOOD PRESSURE: 59 MMHG | RESPIRATION RATE: 18 BRPM | SYSTOLIC BLOOD PRESSURE: 122 MMHG | BODY MASS INDEX: 24.45 KG/M2 | WEIGHT: 138 LBS

## 2019-06-13 DIAGNOSIS — Z90.13 ABSENCE OF BREAST, ACQUIRED, BILATERAL: ICD-10-CM

## 2019-06-13 DIAGNOSIS — C50.912 LOBULAR CARCINOMA OF LEFT BREAST (HCC): Primary | ICD-10-CM

## 2019-06-13 PROCEDURE — 99213 OFFICE O/P EST LOW 20 MIN: CPT | Performed by: NURSE PRACTITIONER

## 2019-06-13 RX ORDER — LEVOTHYROXINE SODIUM 88 UG/1
1 TABLET ORAL DAILY
COMMUNITY
Start: 2018-09-19

## 2019-06-13 RX ORDER — FERROUS SULFATE 325(65) MG
TABLET ORAL
COMMUNITY
Start: 2014-07-22 | End: 2020-06-18 | Stop reason: ALTCHOICE

## 2019-06-13 RX ORDER — TRAZODONE HYDROCHLORIDE 50 MG/1
50 TABLET ORAL NIGHTLY
COMMUNITY

## 2019-06-13 NOTE — PROGRESS NOTES
CHIEF COMPLAINT:  Follow up for BRCA2 mutated breast cancer    Problem List:     Lobular carcinoma of left breast (CMS/HCC)    8/28/2018 Initial Diagnosis     Stage IA Lobular carcinoma of left breast (CMS/HCC): History of multiple prior mammographic abnormalities with biopsies benign.  More recent mammogram in July worrisome and biopsy consistent with invasive lobular carcinoma.  Underwent lumpectomy and sentinel node biopsy 8/8/18 with Dr. Forbes.  This showed a 1.2 cm infiltrating lobular carcinoma grade 2 Stover Terry 6 out of 7 with 0 out of 1 sentinel nodes involved and negative margins.  ER 95% 3+ positive [place] 95% 3+ positive and HER-2/will 0+.  Stage 1a, T1c N0 M0.  I saw for the first time 8/28/18 and ordered Oncotype and the recurrence score was 20 with a 10 year risk of recurrence distantly predicted at 13%.  Will need bilateral mastectomies and at least 5 years of Arimidex.  The only staging to perform would be CBC and CMP with this early stage carcinoma.  With lobular carcinoma and a history of multiple prior mammographic abnormalities which presently are considered benign, she will need close mammographic follow-up.  BRCA2 gene mutation came back positive.  She has had prophylactic bilateral mastectomies with Dr. Forbes 10/1/2018.  Personal history of having her ovaries removed in her 40's.          9/7/2018 Imaging     CT abdomen and pelvis showed lumpectomy site and fatty liver but no abnormal masses or adenopathy or metastases.         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          9/14/2018 -  Hormonal Therapy     Arimidex         9/14/2018 -  Other Event     CMP glucose 169, BUN 18, creatinine 0.65, potassium 4.0, AST 49, ALT 72, alkaline phosphatase 179.           9/21/2018 Imaging     Total body bone scan IMPRESSION:  1. Low-level changes typical of DJD of the  thoracic spine, AC and SC  joints.  2. Small focus of low-level activity in the left ischium, which may also  be benign. Particularly if the patient has symptoms in this region,  consider dedicated imaging.         10/1/2018 Surgery     Surgery       Procedure:  Bilateral total mastectomies              1/8/2019 Imaging     X-Ray pelvis and hips:  Today's exam shows no significant asymmetry of the trabecular pattern of  the left ischium compared to right, no obvious lytic or blastic change  or periosteal reaction. No fracture or avulsion is seen. Pelvic bones  appear intact. SI joints and hip joints appear symmetric and well  maintained.         IMPRESSION:  Pelvis and hips appear within normal limits.            HISTORY OF PRESENT ILLNESS:  The patient is a 58 y.o. female, here for follow up on management of BRCA2 mutated, ER positive left breast cancer currently on adjuvant therapy with Arimidex.  See above for her lobular carcinoma history of present illness.  The patient is tolerating Arimidex with no unusual side effects.  [facility]  She had bone density testing done since we saw her last and states she is now taking Caltrate twice daily.      Past Medical History:   Diagnosis Date   • Arthritis    • Diabetes mellitus (CMS/HCC)    • Disease of thyroid gland    • Hypertension      Past Surgical History:   Procedure Laterality Date   • HYSTERECTOMY         No Known Allergies    Family History and Social History reviewed and changed as necessary      REVIEW OF SYSTEM:   Review of Systems   Constitutional: Negative for appetite change, chills, diaphoresis, fatigue, fever and unexpected weight change.   HENT:   Negative for mouth sores, sore throat and trouble swallowing.    Eyes: Negative for icterus.   Respiratory: Negative for cough, hemoptysis and shortness of breath.    Cardiovascular: Negative for chest pain, leg swelling and palpitations.   Gastrointestinal: Negative for abdominal  "distention, abdominal pain, blood in stool, constipation, diarrhea, nausea and vomiting.   Endocrine: Negative for hot flashes.   Genitourinary: Negative for bladder incontinence, difficulty urinating, dysuria, frequency and hematuria.    Musculoskeletal: Negative for gait problem, neck pain and neck stiffness.   Skin: Negative for rash.   Neurological: Negative for dizziness, gait problem, headaches, light-headedness and numbness.   Hematological: Negative for adenopathy. Does not bruise/bleed easily.   Psychiatric/Behavioral: Negative for depression. The patient is not nervous/anxious.    All other systems reviewed and are negative.       PHYSICAL EXAM    Vitals:    06/13/19 0849   BP: 122/59   Pulse: 81   Resp: 18   Temp: 98.2 °F (36.8 °C)   Weight: 62.6 kg (138 lb)   Height: 159.4 cm (62.75\")     Constitutional: Appears well-developed and well-nourished. No distress.   ECOG: (0) Fully active, able to carry on all predisease performance without restriction  HENT:   Head: Normocephalic.   Mouth/Throat: Oropharynx is clear and moist.   Eyes: Conjunctivae are normal. Pupils are equal, round, and reactive. No scleral icterus.   Neck: Neck supple. No JVD present.   Cardiovascular: Normal rate, regular rhythm and normal heart sounds.    Pulmonary/Chest: Breath sounds normal. No respiratory distress.   Chest: Bilateral chest wall exam status post mastectomies is benign, no abnormal masses, nodules, rashes or lesions.  Nodes: No cervical, supraclavicular or axillary nodes palpable on exam.  Abdominal: Soft. Exhibits no distension and no mass.  There is no tenderness.   Musculoskeletal:Exhibits no edema, tenderness or deformity.   Neurological: Alert and oriented to person, place, and time. Exhibits normal muscle tone.   Skin: No ecchymosis, no petechiae and no rash noted. Not diaphoretic. No cyanosis.    Psychiatric: Normal mood and affect.   Vitals reviewed.      ASSESSMENT & PLAN:    1.  Stage IA Lobular carcinoma of " left breast, ER positive, currently on adjuvant Arimidex  2.  BRCA2 positive  Discussion: The patient is doing well and has no evidence of disease on clinical exam and no new worrisome symptoms.  She is tolerating Arimidex without any unusual side effects.  [facility]  We plan on at least 5 years adjuvant therapy with Arimidex and if tolerating may consider up to 7.  She had bone density testing since we saw her last, I do not have the results of that but states that she is now taking Caltrate twice daily.  She will need to continue bone density testing every couple of years while on Arimidex.  Regarding her BRCA2 positivity, she has had bilateral mastectomies, she has a history of complete hysterectomy, she had dermatological skin exam since we saw her last and will continue that annually.  For routine health maintenance she is up-to-date on screening colonoscopy stating that [facility] and undergo screening colonoscopy every 5 years, last was in 2017.  I will see her back in 6 months for follow-up.    I spent 15 minutes with the patient. I spent > 50% percent of this time counseling and discussing prognosis, diagnostic testing, evaluation, current status and management.    Jessica Bernard, APRN    06/13/2019

## 2019-08-26 DIAGNOSIS — N89.8 VAGINAL DRYNESS: Primary | ICD-10-CM

## 2019-12-19 ENCOUNTER — OFFICE VISIT (OUTPATIENT)
Dept: ONCOLOGY | Facility: CLINIC | Age: 59
End: 2019-12-19

## 2019-12-19 VITALS
SYSTOLIC BLOOD PRESSURE: 133 MMHG | BODY MASS INDEX: 25.69 KG/M2 | TEMPERATURE: 98 F | HEIGHT: 63 IN | DIASTOLIC BLOOD PRESSURE: 66 MMHG | HEART RATE: 93 BPM | WEIGHT: 145 LBS

## 2019-12-19 DIAGNOSIS — C50.912 LOBULAR CARCINOMA OF LEFT BREAST (HCC): Primary | ICD-10-CM

## 2019-12-19 DIAGNOSIS — Z15.01 BRCA2 GENE MUTATION POSITIVE IN FEMALE: ICD-10-CM

## 2019-12-19 DIAGNOSIS — N95.1 MENOPAUSAL VAGINAL DRYNESS: ICD-10-CM

## 2019-12-19 DIAGNOSIS — Z15.09 BRCA2 GENE MUTATION POSITIVE IN FEMALE: ICD-10-CM

## 2019-12-19 DIAGNOSIS — Z15.02 BRCA2 GENE MUTATION POSITIVE IN FEMALE: ICD-10-CM

## 2019-12-19 PROCEDURE — 99213 OFFICE O/P EST LOW 20 MIN: CPT | Performed by: NURSE PRACTITIONER

## 2019-12-19 RX ORDER — PANTOPRAZOLE SODIUM 40 MG/1
TABLET, DELAYED RELEASE ORAL
COMMUNITY
Start: 2019-12-14

## 2019-12-19 NOTE — PROGRESS NOTES
CHIEF COMPLAINT:  Follow up for BRCA2 mutated breast cancer    Problem List:     Lobular carcinoma of left breast (CMS/HCC)    8/28/2018 Initial Diagnosis     Stage IA Lobular carcinoma of left breast (CMS/HCC): History of multiple prior mammographic abnormalities with biopsies benign.  More recent mammogram in July worrisome and biopsy consistent with invasive lobular carcinoma.  Underwent lumpectomy and sentinel node biopsy 8/8/18 with Dr. Forbes.  This showed a 1.2 cm infiltrating lobular carcinoma grade 2 Stover Terry 6 out of 7 with 0 out of 1 sentinel nodes involved and negative margins.  ER 95% 3+ positive CT 95% 3+ positive and HER-2/will 0+.  Stage 1a, T1c N0 M0.  I saw for the first time 8/28/18 and ordered Oncotype and the recurrence score was 20 with a 10 year risk of recurrence distantly predicted at 13%.  Will need bilateral mastectomies and at least 5 years of Arimidex.  The only staging to perform would be CBC and CMP with this early stage carcinoma.  With lobular carcinoma and a history of multiple prior mammographic abnormalities which presently are considered benign, she will need close mammographic follow-up.  BRCA2 gene mutation came back positive.  She has had prophylactic bilateral mastectomies with Dr. Forbes 10/1/2018.  Personal history of having her ovaries removed in her 40's.       9/7/2018 Imaging     CT abdomen and pelvis showed lumpectomy site and fatty liver but no abnormal masses or adenopathy or metastases.      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       9/14/2018 -  Hormonal Therapy     Arimidex      9/14/2018 -  Other Event     CMP glucose 169, BUN 18, creatinine 0.65, potassium 4.0, AST 49, ALT 72, alkaline phosphatase 179.        9/21/2018 Imaging     Total body bone scan IMPRESSION:  1. Low-level changes typical of DJD of the thoracic spine, AC  and SC  joints.  2. Small focus of low-level activity in the left ischium, which may also  be benign. Particularly if the patient has symptoms in this region,  consider dedicated imaging.      10/1/2018 Surgery     Surgery       Procedure:  Bilateral total mastectomies           1/8/2019 Imaging     X-Ray pelvis and hips:  Today's exam shows no significant asymmetry of the trabecular pattern of  the left ischium compared to right, no obvious lytic or blastic change  or periosteal reaction. No fracture or avulsion is seen. Pelvic bones  appear intact. SI joints and hip joints appear symmetric and well  maintained.         IMPRESSION:  Pelvis and hips appear within normal limits.         HISTORY OF PRESENT ILLNESS:  The patient is a 59 y.o. female, here for follow up on management of BRCA2 mutated, ER positive left breast cancer currently on adjuvant therapy with Arimidex.  See above for her lobular carcinoma history of present illness.  The patient is tolerating Arimidex with no unusual side effects.  She does have vaginal dryness, we had tried compounded coconut oil and vitamin E cream however she states it did not help much.  No new or concerning findings on chest wall exam, no new or concerning bony aches or pains.  Had a recent bout of back pain and sciatica, improved with steroid injection and lidocaine patches.  Denies any pain currently.    Past Medical History:   Diagnosis Date   • Arthritis    • Diabetes mellitus (CMS/HCC)    • Disease of thyroid gland    • Hypertension      Past Surgical History:   Procedure Laterality Date   • HYSTERECTOMY         No Known Allergies    Family History and Social History reviewed and changed as necessary      REVIEW OF SYSTEM:   Review of Systems   Constitutional: Negative for appetite change, chills, diaphoresis, fatigue, fever and unexpected weight change.   HENT:   Negative for mouth sores, sore throat and trouble swallowing.    Eyes: Negative for icterus.   Respiratory:  "Negative for cough, hemoptysis and shortness of breath.    Cardiovascular: Negative for chest pain, leg swelling and palpitations.   Gastrointestinal: Negative for abdominal distention, abdominal pain, blood in stool, constipation, diarrhea, nausea and vomiting.   Endocrine: Negative for hot flashes.   Genitourinary: Negative for bladder incontinence, difficulty urinating, dysuria, frequency and hematuria.    Musculoskeletal: Negative for gait problem, neck pain and neck stiffness.   Skin: Negative for rash.   Neurological: Negative for dizziness, gait problem, headaches, light-headedness and numbness.   Hematological: Negative for adenopathy. Does not bruise/bleed easily.   Psychiatric/Behavioral: Negative for depression. The patient is not nervous/anxious.    All other systems reviewed and are negative.       PHYSICAL EXAM    Vitals:    12/19/19 0821   BP: 133/66   Pulse: 93   Temp: 98 °F (36.7 °C)   Weight: 65.8 kg (145 lb)   Height: 159.4 cm (62.75\")     Constitutional: Appears well-developed and well-nourished. No distress.   ECOG: (0) Fully active, able to carry on all predisease performance without restriction  HENT:   Head: Normocephalic.   Mouth/Throat: Oropharynx is clear and moist.   Eyes: Conjunctivae are normal. Pupils are equal, round, and reactive. No scleral icterus.   Neck: Neck supple. No JVD present.   Cardiovascular: Normal rate, regular rhythm and normal heart sounds.    Pulmonary/Chest: Breath sounds normal. No respiratory distress.   Chest: Bilateral chest wall exam status post mastectomies is benign, no abnormal masses, nodules, rashes or lesions.  Nodes: No cervical, supraclavicular or axillary nodes palpable on exam.  Abdominal: Soft. Exhibits no distension and no mass.  There is no tenderness.   Musculoskeletal:Exhibits no edema, tenderness or deformity.   Neurological: Alert and oriented to person, place, and time. Exhibits normal muscle tone.   Skin: No ecchymosis, no petechiae and no " rash noted. Not diaphoretic. No cyanosis.    Psychiatric: Normal mood and affect.   Vitals reviewed.      ASSESSMENT & PLAN:    1.  Stage IA Lobular carcinoma of left breast, ER positive, currently on adjuvant Arimidex  2.  BRCA2 positive  3.  Vaginal dryness    Discussion: The patient is doing well and has no evidence of disease on clinical exam and no new worrisome symptoms.  She is tolerating Arimidex without any unusual side effects.  [facility]  We plan on at least 5 years adjuvant therapy with Arimidex and if tolerating may consider up to 7.  She does have vaginal dryness, we had tried a compound of vitamin D and coconut oil however that was not very helpful.  We discussed today that in certain circumstances we will agree to a trial of vaginal estrogen cream at a low dose if the benefits outweigh the risks.  She states she is not interested at this time and will let us know if she changes her mind.  She had bone density testing last year, I do not have the results of that but she is taking Caltrate twice daily.  She will need to continue bone density testing every couple of years while on Arimidex.  Regarding her BRCA2 positivity, she has had bilateral mastectomies, she has a history of complete hysterectomy, she has annual dermatological skin exams.  For routine health maintenance she is up-to-date on screening colonoscopy stating that [facility] and undergo screening colonoscopy every 5 years, last was in 2017.  I will see her back in 6 months for follow-up.    I spent a total of 15 minutes in direct patient care, greater than 10  minutes (greater than 50%) were spent in coordination of care, and counseling the patient regarding  (diagnosis) . Answered any questions patient had regarding medications and plan of care.     Jessica Bernard, APRN    12/19/2019

## 2020-06-18 ENCOUNTER — OFFICE VISIT (OUTPATIENT)
Dept: ONCOLOGY | Facility: CLINIC | Age: 60
End: 2020-06-18

## 2020-06-18 VITALS
DIASTOLIC BLOOD PRESSURE: 61 MMHG | BODY MASS INDEX: 26.4 KG/M2 | WEIGHT: 149 LBS | TEMPERATURE: 97.6 F | SYSTOLIC BLOOD PRESSURE: 119 MMHG | RESPIRATION RATE: 18 BRPM | HEIGHT: 63 IN | HEART RATE: 81 BPM

## 2020-06-18 DIAGNOSIS — Z15.09 BRCA2 GENE MUTATION POSITIVE IN FEMALE: ICD-10-CM

## 2020-06-18 DIAGNOSIS — C50.912 LOBULAR CARCINOMA OF LEFT BREAST (HCC): Primary | ICD-10-CM

## 2020-06-18 DIAGNOSIS — N89.8 VAGINAL DRYNESS: ICD-10-CM

## 2020-06-18 DIAGNOSIS — Z15.02 BRCA2 GENE MUTATION POSITIVE IN FEMALE: ICD-10-CM

## 2020-06-18 DIAGNOSIS — Z15.01 BRCA2 GENE MUTATION POSITIVE IN FEMALE: ICD-10-CM

## 2020-06-18 DIAGNOSIS — Z90.13 ABSENCE OF BREAST, ACQUIRED, BILATERAL: ICD-10-CM

## 2020-06-18 PROCEDURE — 99214 OFFICE O/P EST MOD 30 MIN: CPT | Performed by: NURSE PRACTITIONER

## 2020-06-18 RX ORDER — FERROUS GLUCONATE 324(37.5)
TABLET ORAL
COMMUNITY
End: 2022-12-07

## 2020-06-18 NOTE — PROGRESS NOTES
CHIEF COMPLAINT:  1.  Vaginal dryness and dyspareunia  2.  BRCA2 mutated breast cancer    Problem List:     Lobular carcinoma of left breast (CMS/HCC)    8/28/2018 Initial Diagnosis     Stage IA Lobular carcinoma of left breast (CMS/HCC): History of multiple prior mammographic abnormalities with biopsies benign.  More recent mammogram in July worrisome and biopsy consistent with invasive lobular carcinoma.  Underwent lumpectomy and sentinel node biopsy 8/8/18 with Dr. Forbes.  This showed a 1.2 cm infiltrating lobular carcinoma grade 2 Stover Terry 6 out of 7 with 0 out of 1 sentinel nodes involved and negative margins.  ER 95% 3+ positive [place] 95% 3+ positive and HER-2/will 0+.  Stage 1a, T1c N0 M0.  I saw for the first time 8/28/18 and ordered Oncotype and the recurrence score was 20 with a 10 year risk of recurrence distantly predicted at 13%.  Will need bilateral mastectomies and at least 5 years of Arimidex.  The only staging to perform would be CBC and CMP with this early stage carcinoma.  With lobular carcinoma and a history of multiple prior mammographic abnormalities which presently are considered benign, she will need close mammographic follow-up.  BRCA2 gene mutation came back positive.  She has had prophylactic bilateral mastectomies with Dr. Forbes 10/1/2018.  Personal history of having her ovaries removed in her 40's.       9/7/2018 Imaging     CT abdomen and pelvis showed lumpectomy site and fatty liver but no abnormal masses or adenopathy or metastases.      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       9/14/2018 -  Hormonal Therapy     Arimidex      9/14/2018 -  Other Event     CMP glucose 169, BUN 18, creatinine 0.65, potassium 4.0, AST 49, ALT 72, alkaline phosphatase 179.        9/21/2018 Imaging     Total body bone scan IMPRESSION:  1. Low-level changes typical of  DJD of the thoracic spine, AC and SC  joints.  2. Small focus of low-level activity in the left ischium, which may also  be benign. Particularly if the patient has symptoms in this region,  consider dedicated imaging.      10/1/2018 Surgery     Surgery       Procedure:  Bilateral total mastectomies           1/8/2019 Imaging     X-Ray pelvis and hips:  Today's exam shows no significant asymmetry of the trabecular pattern of  the left ischium compared to right, no obvious lytic or blastic change  or periosteal reaction. No fracture or avulsion is seen. Pelvic bones  appear intact. SI joints and hip joints appear symmetric and well  maintained.         IMPRESSION:  Pelvis and hips appear within normal limits.         HISTORY OF PRESENT ILLNESS:  The patient is a 59 y.o. female, here for follow up on management of BRCA2 mutated, ER positive left breast cancer currently on adjuvant therapy with Arimidex.  See above for her lobular carcinoma history of present illness.  The patient is tolerating Arimidex with no unusual side effects.  She continues to report vaginal dryness and dyspareunia, using compounded coconut oil and vitamin E cream however she states it only helps minimally but does need a refill.  No new or concerning findings on chest wall exam, no new or concerning bony aches or pains.  States that she is now on insulin for diabetes, working with her primary care provider on that.  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      Past Medical History:   Diagnosis Date   • Arthritis    • Diabetes mellitus (CMS/HCC)    • Disease of thyroid gland    • Hypertension      Past Surgical History:   Procedure Laterality Date   • HYSTERECTOMY         No Known Allergies    Family History and Social History reviewed and changed as necessary      REVIEW OF SYSTEM:   Review of Systems   Constitutional: Negative for appetite change, chills, diaphoresis, fatigue, fever and  "unexpected weight change.   HENT:   Negative for mouth sores, sore throat and trouble swallowing.    Eyes: Negative for icterus.   Respiratory: Negative for cough, hemoptysis and shortness of breath.    Cardiovascular: Negative for chest pain, leg swelling and palpitations.   Gastrointestinal: Negative for abdominal distention, abdominal pain, blood in stool, constipation, diarrhea, nausea and vomiting.   Endocrine: Negative for hot flashes.   Genitourinary: Negative for bladder incontinence, difficulty urinating, dysuria, frequency and hematuria.  Positive for vaginal dryness.  Musculoskeletal: Negative for gait problem, neck pain and neck stiffness.   Skin: Negative for rash.   Neurological: Negative for dizziness, gait problem, headaches, light-headedness and numbness.   Hematological: Negative for adenopathy. Does not bruise/bleed easily.   Psychiatric/Behavioral: Negative for depression. The patient is not nervous/anxious.    All other systems reviewed and are negative.       PHYSICAL EXAM    Vitals:    06/18/20 0823   BP: 119/61   Pulse: 81   Resp: 18   Temp: 97.6 °F (36.4 °C)   Weight: 67.6 kg (149 lb)   Height: 159.4 cm (62.75\")     Constitutional: Appears well-developed and well-nourished. No distress.   ECOG: (0) Fully active, able to carry on all predisease performance without restriction  HENT:   Head: Normocephalic.   Mouth/Throat: Oropharynx is clear and moist.   Eyes: Conjunctivae are normal. Pupils are equal, round, and reactive. No scleral icterus.   Neck: Neck supple. No JVD present.   Cardiovascular: Normal rate, regular rhythm and normal heart sounds.    Pulmonary/Chest: Breath sounds normal. No respiratory distress.   Chest: Bilateral chest wall exam status post mastectomies is benign, no abnormal masses, nodules, rashes or lesions.  Nodes: No cervical, supraclavicular or axillary nodes palpable on exam.  Abdominal: Soft. Exhibits no distension and no mass.  There is no tenderness. "   Musculoskeletal:Exhibits no edema, tenderness or deformity.   Neurological: Alert and oriented to person, place, and time. Exhibits normal muscle tone.   Skin: No ecchymosis, no petechiae and no rash noted. Not diaphoretic. No cyanosis.    Psychiatric: Normal mood and affect.   Vitals reviewed.      ASSESSMENT & PLAN:    1.  Stage IA Lobular carcinoma of left breast, ER positive, currently on adjuvant Arimidex  2.  BRCA2 positive  3.  Vaginal dryness  4.  Dyspareunia    Discussion: The patient is doing well and has no evidence of disease on clinical exam and no new worrisome symptoms.  She is tolerating Arimidex without any unusual side effects.  [facility]  We plan on at least 5 years adjuvant therapy with Arimidex and if tolerating may consider up to 7.  She continues to have vaginal dryness along with dyspareunia, I have sent a refill for compound of vitamin D and coconut oil which is only minimally helpful.  We once again discussed the consideration of a trial of vaginal estrogen product at a low dose, with the understanding that there are risks in light of her ER positive breast cancer and she would have to decide if the benefits outweigh the risks.  She states she is not interested at this time and will let us know if she changes her mind.  I have given her written information regarding the amount of systemic absorption of vaginal estrogen products, she has an appointment in August with her gynecologist and may discuss further.  She had bone density testing last year and states it was normal, I do not have the results of that but she is taking Caltrate twice daily.  She will need to continue bone density testing every couple of years while on Arimidex.  Regarding her BRCA2 positivity, she has had bilateral mastectomies, she has a history of complete hysterectomy, she has annual dermatological skin exams, had last exam earlier this year.  I have reached out to our genetic  counselors to see if there is a program in any near vicinity for screening for pancreatic cancer with her BRCA2 positivity and also sister who  with pancreatic cancer at the age of 48.  For routine health maintenance she is up-to-date on screening colonoscopy stating that [facility] and undergo screening colonoscopy every 5 years, last was in 2017.    I will see her back in 6 months for follow-up.    I spent a total of 30 minutes in direct patient care, greater than 20  minutes face to face, time was spent in coordination of care, and counseling the patient regarding discussion of symptoms and options for care with vaginal dryness and dyspareunia, discussion of screening recommendations for BRCA2 positive, plan of care going forward.  Answered any questions patient had regarding medications and plan of care.     Jessica Bernard, APRN    2020

## 2020-06-22 ENCOUNTER — TELEPHONE (OUTPATIENT)
Dept: ONCOLOGY | Facility: CLINIC | Age: 60
End: 2020-06-22

## 2020-06-22 NOTE — TELEPHONE ENCOUNTER
I called Sig[facility] to discuss the [facility] High Risk clinic at  for screening for pancreatic cancer.  I would like to refer her to this clinic in light of her high risk.  She states agreement to the referral.  I will reach out to Luzmaria Pryor who will refer the patient for me.

## 2020-12-31 ENCOUNTER — TELEMEDICINE (OUTPATIENT)
Dept: ONCOLOGY | Facility: CLINIC | Age: 60
End: 2020-12-31

## 2020-12-31 DIAGNOSIS — Z15.09 BRCA2 GENE MUTATION POSITIVE IN FEMALE: ICD-10-CM

## 2020-12-31 DIAGNOSIS — Z15.02 BRCA2 GENE MUTATION POSITIVE IN FEMALE: ICD-10-CM

## 2020-12-31 DIAGNOSIS — Z15.01 BRCA2 GENE MUTATION POSITIVE IN FEMALE: ICD-10-CM

## 2020-12-31 DIAGNOSIS — C50.912 LOBULAR CARCINOMA OF LEFT BREAST (HCC): Primary | ICD-10-CM

## 2020-12-31 PROCEDURE — 99213 OFFICE O/P EST LOW 20 MIN: CPT | Performed by: NURSE PRACTITIONER

## 2020-12-31 NOTE — PROGRESS NOTES
CHIEF COMPLAINT:  1.  Skin lesions   2.  BRCA2 mutated breast cancer    Problem List:  Oncology/Hematology History   Lobular carcinoma of left breast (CMS/HCC)   8/28/2018 Initial Diagnosis    Stage IA Lobular carcinoma of left breast (CMS/HCC): History of multiple prior mammographic abnormalities with biopsies benign.  More recent mammogram in July worrisome and biopsy consistent with invasive lobular carcinoma.  Underwent lumpectomy and sentinel node biopsy 8/8/18 with Dr. Forbes.  This showed a 1.2 cm infiltrating lobular carcinoma grade 2 Stover Terry 6 out of 7 with 0 out of 1 sentinel nodes involved and negative margins.  ER 95% 3+ positive [place] 95% 3+ positive and HER-2/will 0+.  Stage 1a, T1c N0 M0.  I saw for the first time 8/28/18 and ordered Oncotype and the recurrence score was 20 with a 10 year risk of recurrence distantly predicted at 13%.  Will need bilateral mastectomies and at least 5 years of Arimidex.  The only staging to perform would be CBC and CMP with this early stage carcinoma.  With lobular carcinoma and a history of multiple prior mammographic abnormalities which presently are considered benign, she will need close mammographic follow-up.  BRCA2 gene mutation came back positive.  She has had prophylactic bilateral mastectomies with Dr. Forbes 10/1/2018.  Personal history of having her ovaries removed in her 40's.      9/7/2018 Imaging    CT abdomen and pelvis showed lumpectomy site and fatty liver but no abnormal masses or adenopathy or metastases.     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      9/14/2018 -  Hormonal Therapy    Arimidex     9/14/2018 -  Other Event    CMP glucose 169, BUN 18, creatinine 0.65, potassium 4.0, AST 49, ALT 72, alkaline phosphatase 179.       9/21/2018 Imaging    Total body bone scan IMPRESSION:  1. Low-level changes typical of DJD  of the thoracic spine, AC and SC  joints.  2. Small focus of low-level activity in the left ischium, which may also  be benign. Particularly if the patient has symptoms in this region,  consider dedicated imaging.     10/1/2018 Surgery    Surgery       Procedure:  Bilateral total mastectomies          2019 Imaging    X-Ray pelvis and hips:  Today's exam shows no significant asymmetry of the trabecular pattern of  the left ischium compared to right, no obvious lytic or blastic change  or periosteal reaction. No fracture or avulsion is seen. Pelvic bones  appear intact. SI joints and hip joints appear symmetric and well  maintained.         IMPRESSION:  Pelvis and hips appear within normal limits.         HISTORY OF PRESENT ILLNESS:  The patient is a 60 y.o. female, here for follow up on management of BRCA2 mutated, ER positive left breast cancer currently on adjuvant therapy with Arimidex.  See above for her lobular carcinoma history of present illness.  The patient is tolerating Arimidex with no unusual side effects.  Sigrid reports she has noted the appearance of tiny, flat red dots on her upper abdomen and chest.  They are nontender, no associated redness or swelling.  Up-to-date on dermatological check, states her next skin check is early next year she thinks around February.  No new or concerning findings on chest wall exam, no new or concerning bony aches or pains.  Reports her glucose has been under better control.  We had put in a referral to the high risk GI clinic at [facility] in regards to her BRCA 2 and having a sister who  with pancreatic cancer.  She states that she has not heard from anyone regarding that referral.  Otherwise she feels well, appetite is normal, weight is stable.  Denies any new pain.  No change in her bowel or bladder habits.  No neurological concerns.    Past Medical History:   Diagnosis Date   • Arthritis    • Diabetes mellitus (CMS/HCC)    • Disease of thyroid  gland    • Hypertension      Past Surgical History:   Procedure Laterality Date   • HYSTERECTOMY         No Known Allergies    Family History and Social History reviewed and changed as necessary      REVIEW OF SYSTEM:   Review of Systems   Constitutional: Negative for appetite change, chills, diaphoresis, fatigue, fever and unexpected weight change.   HENT:   Negative for mouth sores, sore throat and trouble swallowing.    Eyes: Negative for icterus.   Respiratory: Negative for cough, hemoptysis and shortness of breath.    Cardiovascular: Negative for chest pain, leg swelling and palpitations.   Gastrointestinal: Negative for abdominal distention, abdominal pain, blood in stool, constipation, diarrhea, nausea and vomiting.   Endocrine: Negative for hot flashes.   Genitourinary: Negative for bladder incontinence, difficulty urinating, dysuria, frequency and hematuria.  Positive for vaginal dryness.  Musculoskeletal: Negative for gait problem, neck pain and neck stiffness.   Skin: Negative for rash. Positive for small, flat red lesions on upper abdomen and chest.  Neurological: Negative for dizziness, gait problem, headaches, light-headedness and numbness.   Hematological: Negative for adenopathy. Does not bruise/bleed easily.   Psychiatric/Behavioral: Negative for depression. The patient is not nervous/anxious.    All other systems reviewed and are negative.       PHYSICAL EXAM    There were no vitals filed for this visit.  Constitutional: Appears well-developed and well-nourished. No distress.   ECOG: (0) Fully active, able to carry on all predisease performance without restriction  HENT:   Head: Normocephalic.   Mouth/Throat: Oropharynx is clear and moist.   Eyes: Patient wearing glasses.  Neck: Neck supple. No JVD present.    Pulmonary/Chest: Respirations are regular and unlabored with normal conversation.  No distress.  Chest: I did do a visual bilateral chest wall exam, she is status post mastectomies, no  abnormal masses were seen on visual exam and patient with no palpable abnormalities she detects.  She does point out the tiny dots on her upper abdomen and chest wall that she was concerned about, on my visual exam they appear to be flat nevi, the color on video appears to be brown however she states they are red.  This appeared to be cherry angiomas on my visual exam which is limited with video.  Nodes: No cervical, supraclavicular or axillary nodes palpable on exam.  Abdominal: Soft. Exhibits no distension and no mass.  There is no tenderness.   Musculoskeletal:Exhibits no edema, tenderness or deformity.   Neurological: Alert and oriented to person, place, and time. Exhibits normal muscle tone.   Skin: No ecchymosis, no petechiae and no rash noted. Not diaphoretic. No cyanosis.    Psychiatric: Normal mood and affect.   Vitals reviewed.      ASSESSMENT & PLAN:    1.  Stage IA Lobular carcinoma of left breast, ER positive, currently on adjuvant Arimidex  2.  BRCA2 positive  3.  Family history of pancreatic cancer in sister   4.  Skin lesions that appear to be cherry angiomas    Discussion: The patient is doing well and has no evidence of disease on clinical exam and no new worrisome symptoms.  She is tolerating Arimidex with no unusual side effects.  [facility]  We plan on at least 5 years adjuvant therapy with Arimidex and if tolerating may consider up to 7, she began therapy September 2018.  She had bone density testing last year and states it was normal, I do not have the results of that but she is taking Caltrate twice daily.  She will need to continue bone density testing every couple of years while on Arimidex.    She was concerned about small, red skin lesions that have appeared on her upper abdomen and chest.  On video visual exam today they appear to be benign cherry angiomas.  I discussed with her that these are usually noticed as we age, they appear benign to my eye but I do  recommend she follow-up with her dermatologist.  She states that she will be due for her annual skin check early 2021 and will make sure she has that appointment.  Regarding her BRCA2 positivity, she has had bilateral mastectomies, she has a history of complete hysterectomy, she has annual dermatological skin exams.    We had referred her after I saw her last to the GI high risk clinic at the [facility] in light of her BRCA2 positivity and also having a first-degree relative (sister) with pancreatic cancer.  She states that she has not heard from them, I will check with our genetic counselors and will rerefer if needed.  For routine health maintenance she is up-to-date on screening colonoscopy stating that [facility] and undergo screening colonoscopy every 5 years, last was in 2017.    I will see her back in 6 months for follow-up.    This visit has been rescheduled as a telemedicine video visit to comply with patient safety concerns in accordance with CDC recommendations. Total time of discussion was 16 minutes.    Jessica Bernard, APRN  12/31/2020

## 2021-03-26 ENCOUNTER — BULK ORDERING (OUTPATIENT)
Dept: CASE MANAGEMENT | Facility: OTHER | Age: 61
End: 2021-03-26

## 2021-03-26 DIAGNOSIS — Z23 IMMUNIZATION DUE: ICD-10-CM

## 2021-12-01 ENCOUNTER — OFFICE VISIT (OUTPATIENT)
Dept: ONCOLOGY | Facility: CLINIC | Age: 61
End: 2021-12-01

## 2021-12-01 VITALS
RESPIRATION RATE: 16 BRPM | HEIGHT: 63 IN | DIASTOLIC BLOOD PRESSURE: 64 MMHG | OXYGEN SATURATION: 96 % | SYSTOLIC BLOOD PRESSURE: 118 MMHG | BODY MASS INDEX: 24.8 KG/M2 | HEART RATE: 83 BPM | TEMPERATURE: 97.2 F | WEIGHT: 140 LBS

## 2021-12-01 DIAGNOSIS — Z15.01 BRCA2 GENE MUTATION POSITIVE IN FEMALE: ICD-10-CM

## 2021-12-01 DIAGNOSIS — Z15.09 BRCA2 GENE MUTATION POSITIVE IN FEMALE: ICD-10-CM

## 2021-12-01 DIAGNOSIS — C50.912 LOBULAR CARCINOMA OF LEFT BREAST (HCC): Primary | ICD-10-CM

## 2021-12-01 DIAGNOSIS — Z15.02 BRCA2 GENE MUTATION POSITIVE IN FEMALE: ICD-10-CM

## 2021-12-01 PROCEDURE — 99214 OFFICE O/P EST MOD 30 MIN: CPT | Performed by: NURSE PRACTITIONER

## 2021-12-01 RX ORDER — PREDNISONE 1 MG/1
TABLET ORAL
COMMUNITY
Start: 2021-11-29 | End: 2022-12-07

## 2021-12-01 RX ORDER — CHOLECALCIFEROL (VITAMIN D3) 1250 MCG
CAPSULE ORAL
COMMUNITY
Start: 2020-04-12 | End: 2023-02-20

## 2021-12-01 RX ORDER — MULTIVIT WITH MINERALS/LUTEIN
TABLET ORAL
COMMUNITY
Start: 2020-04-12

## 2021-12-01 NOTE — PROGRESS NOTES
CHIEF COMPLAINT: 1.  Left breast cancer   2.  BRCA2 gene positive    Problem List:  Oncology/Hematology History   Lobular carcinoma of left breast (HCC)   8/28/2018 Initial Diagnosis    Stage IA Lobular carcinoma of left breast (CMS/HCC): History of multiple prior mammographic abnormalities with biopsies benign.  More recent mammogram in July worrisome and biopsy consistent with invasive lobular carcinoma.  Underwent lumpectomy and sentinel node biopsy 8/8/18 with Dr. Forbes.  This showed a 1.2 cm infiltrating lobular carcinoma grade 2 Stover Terry 6 out of 7 with 0 out of 1 sentinel nodes involved and negative margins.  ER 95% 3+ positive [place] 95% 3+ positive and HER-2/will 0+.  Stage 1a, T1c N0 M0.  I saw for the first time 8/28/18 and ordered Oncotype and the recurrence score was 20 with a 10 year risk of recurrence distantly predicted at 13%.  Will need bilateral mastectomies and at least 5 years of Arimidex.  The only staging to perform would be CBC and CMP with this early stage carcinoma.  With lobular carcinoma and a history of multiple prior mammographic abnormalities which presently are considered benign, she will need close mammographic follow-up.  BRCA2 gene mutation came back positive.  She has had prophylactic bilateral mastectomies with Dr. Forbes 10/1/2018.  Personal history of having her ovaries removed in her 40's.      9/7/2018 Imaging    CT abdomen and pelvis showed lumpectomy site and fatty liver but no abnormal masses or adenopathy or metastases.     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      9/14/2018 -  Hormonal Therapy    Arimidex     9/14/2018 -  Other Event    CMP glucose 169, BUN 18, creatinine 0.65, potassium 4.0, AST 49, ALT 72, alkaline phosphatase 179.       9/21/2018 Imaging    Total body bone scan IMPRESSION:  1. Low-level changes typical of DJD of the  thoracic spine, AC and SC  joints.  2. Small focus of low-level activity in the left ischium, which may also  be benign. Particularly if the patient has symptoms in this region,  consider dedicated imaging.     10/1/2018 Surgery    Surgery       Procedure:  Bilateral total mastectomies          1/8/2019 Imaging    X-Ray pelvis and hips:  Today's exam shows no significant asymmetry of the trabecular pattern of  the left ischium compared to right, no obvious lytic or blastic change  or periosteal reaction. No fracture or avulsion is seen. Pelvic bones  appear intact. SI joints and hip joints appear symmetric and well  maintained.         IMPRESSION:  Pelvis and hips appear within normal limits.         HISTORY OF PRESENT ILLNESS:  The patient is a 61 y.o. female, here for follow up on management of ER positive left breast cancer currently on adjuvant therapy with Arimidex with BRCA2 gene mutation.  Sigrid has been doing well since we saw her last with no new concerns.  Tolerating Arimidex with no unusual side effects.  She has no new or concerning findings on chest wall exam, no new or concerning bony aches or pains.  Since we saw her last she was seen in the high risk GI clinic at [facility] and had MRI of the abdomen and blood work, all returned normal.  She reports that she will be seeing them annually.    Past Medical History:   Diagnosis Date   • Arthritis    • Diabetes mellitus (HCC)    • Disease of thyroid gland    • Hypertension      Past Surgical History:   Procedure Laterality Date   • HYSTERECTOMY     • [facility] [place]       No Known Allergies    Family History and Social History reviewed and changed as necessary    REVIEW OF SYSTEM:   Negative for new concerns    PHYSICAL EXAM:  General: Well-developed, well-nourished healthy-appearing female in no distress.  Nodes: No cervical, supraclavicular or axillary nodes palpable on exam.  Chest: Bilateral chest wall exam  "is benign status post bilateral mastectomies, no abnormal masses, nodules, rashes or lesions.    Vitals:    12/01/21 1122   BP: 118/64   Pulse: 83   Resp: 16   Temp: 97.2 °F (36.2 °C)   SpO2: 96%   Weight: 63.5 kg (140 lb)   Height: 159.4 cm (62.75\")     Vitals:    12/01/21 1122   PainSc: 0-No pain          ECOG score: 0           Vitals reviewed.    ECOG: (0) Fully Active - Able to Carry On All Pre-disease Performance Without Restriction        ASSESSMENT & PLAN:    1.  Stage IA Lobular carcinoma of left breast, ER positive, currently on adjuvant Arimidex  2.  BRCA2 positive  3.  Family history of pancreatic cancer in sister  4.  Type II diabetes mellitus, insulin-dependent    Discussion:  Sigrid continues to do well, she has no evidence of disease on clinical exam and no new worrisome symptoms.  She is tolerating Arimidex with no unusual side effects.  [facility]  We plan on at least 5 years adjuvant therapy with Arimidex and if tolerating may consider up to 10 years.  She began therapy September 2018.  She reports previous bone density testing was sometime around 2019 that was normal, she does take Caltrate twice daily.  She will need to continue having this every couple of years while on Arimidex, she receives this through her primary care provider, she will ask about this at her next visit.  She is being followed now in the high risk GI clinic at [facility] in light of her BRCA2 positivity and first-degree relative (sister) who had pancreatic cancer.  She was seen earlier this year and had MRI abdomen that had no abnormal findings, I was able to review this report in Epic.  She will continue to see them annually.  She will continue with annual skin checks with dermatology.  Her diabetes she reports is under much better control this year.     Return to clinic in 1 year for follow-up.    I spent 30 minutes caring for Sigrid on this date of service. This time includes " time spent by me in the following activities: preparing for the visit, reviewing tests, obtaining and/or reviewing a separately obtained history, performing a medically appropriate examination and/or evaluation and documenting information in the medical record.     Jessica Bernard, APRN    12/01/2021

## 2022-03-08 ENCOUNTER — DOCUMENTATION (OUTPATIENT)
Dept: ENDOCRINOLOGY | Facility: CLINIC | Age: 62
End: 2022-03-08

## 2022-03-08 NOTE — PROGRESS NOTES
Patient not eligible to fill specialty medication at [facility]. Reason:   Patient has [employer] insurance and we are unable to mail to [employer] patients  (Bill)      Radha Whiteside CPhT  Pharmacy Care Coordinator  3/8/2022  10:47 EST

## 2022-03-29 ENCOUNTER — TELEPHONE (OUTPATIENT)
Dept: FAMILY MEDICINE CLINIC | Facility: CLINIC | Age: 62
End: 2022-03-29

## 2022-05-25 ENCOUNTER — TELEPHONE (OUTPATIENT)
Dept: FAMILY MEDICINE CLINIC | Facility: CLINIC | Age: 62
End: 2022-05-25

## 2022-05-25 NOTE — TELEPHONE ENCOUNTER
Patient called and said she took a home covid test and it is positive.  She's had a headache since Saturday and started feeling pretty bad yesterday.  She wants to know what the recommendations are about what to take OTC and how long she needs to quarantine.

## 2022-05-25 NOTE — TELEPHONE ENCOUNTER
Called patient.  She has been having symptoms for 5 days.  She has a headache and just aching all over.  No shortness of breath.  I talked about taking some over-the-counter medications.  Call us if she gets worse.  She has had 2 vaccine and 1 booster shot.

## 2022-06-21 DIAGNOSIS — E78.2 MIXED HYPERLIPIDEMIA: Primary | ICD-10-CM

## 2022-06-22 RX ORDER — ICOSAPENT ETHYL 1000 MG/1
CAPSULE ORAL
Qty: 360 CAPSULE | Refills: 3 | Status: SHIPPED | OUTPATIENT
Start: 2022-06-22

## 2022-07-11 ENCOUNTER — TELEPHONE (OUTPATIENT)
Dept: FAMILY MEDICINE CLINIC | Facility: CLINIC | Age: 62
End: 2022-07-11

## 2022-07-11 NOTE — TELEPHONE ENCOUNTER
PATIENT CALLED, SHE'S BEEN TRYING TO GET A REFERRAL TO HER ALLERGIST, DR. RING.  SHE'S BEEN SEEING HIM FOR YEARS, HOWEVER, DUE TO HER INSURANCE, [facility] [place], THE REFERRAL IS NEEDED ONCE A YEAR.  SHE'S HAD TO CHANGE HER APPOINTMENT SEVERAL TIMES DUE TO NOT HAVING THE REFERRAL COMPLETED.  HER UPCOMING APPOINTMENT WITH DR. RING IS SCHEDULED FOR 08/12/22 AT 8:30 AM, THIS IS PRETTY URGENT.  THANK YOU!!

## 2022-10-04 ENCOUNTER — TELEPHONE (OUTPATIENT)
Dept: GASTROENTEROLOGY | Facility: CLINIC | Age: 62
End: 2022-10-04

## 2022-10-04 ENCOUNTER — PREP FOR SURGERY (OUTPATIENT)
Dept: SURGERY | Facility: SURGERY CENTER | Age: 62
End: 2022-10-04

## 2022-10-04 DIAGNOSIS — Z86.010 PERSONAL HISTORY OF COLONIC POLYPS: Primary | ICD-10-CM

## 2022-10-04 PROBLEM — Z86.0100 PERSONAL HISTORY OF COLONIC POLYPS: Status: ACTIVE | Noted: 2022-10-04

## 2022-10-04 NOTE — TELEPHONE ENCOUNTER
[facility]  LAST COLONOSCOPY 10/10/2017  PERSONAL HISTORY POLYPS  NO FAMILY HISTORY CRC/P  SCHEDULE AT [place]

## 2022-10-04 NOTE — TELEPHONE ENCOUNTER
Spoke with patient.  Scheduled at [place] 02/22/2023 at 8:45am - arrive 7:30am.  Will mail instructions.

## 2022-10-31 ENCOUNTER — TELEPHONE (OUTPATIENT)
Dept: FAMILY MEDICINE CLINIC | Facility: CLINIC | Age: 62
End: 2022-10-31

## 2022-11-08 NOTE — TELEPHONE ENCOUNTER
SPOKE TO PATIENT, INFORMED I WOULD GO THROUGH HER OLD RECORDS TO GET ALL INFO NEEDED FOR THESE REFERRALS AND GET THEM SUBMITTED.

## 2022-12-01 ENCOUNTER — TELEPHONE (OUTPATIENT)
Dept: ONCOLOGY | Facility: CLINIC | Age: 62
End: 2022-12-01

## 2022-12-01 NOTE — TELEPHONE ENCOUNTER
LEFT VM CONFIRMING PT DOES HAVE UP COMING APPT AND IF PT CALLED AND REQUESTED ONE BECAUSE OF HER INSURANCE BUT WE DID NOT CALL THERE OFFICE

## 2022-12-01 NOTE — TELEPHONE ENCOUNTER
Caller: TATIANA    Relationship: PCP    Best call back number: [phone] [employer]    What is the best time to reach you: ANYTIME    Who are you requesting to speak with (clinical staff, provider,  specific staff member): CLINICAL    What was the call regarding: TATIANA IS NEEDING TO KNOW IF JOHN IS NEEDING AN AUTH FOR HER UPCOMING VISIT FROM [facility]    Do you require a callback: YES

## 2022-12-07 ENCOUNTER — OFFICE VISIT (OUTPATIENT)
Dept: ONCOLOGY | Facility: CLINIC | Age: 62
End: 2022-12-07

## 2022-12-07 VITALS
OXYGEN SATURATION: 98 % | WEIGHT: 134 LBS | HEART RATE: 83 BPM | BODY MASS INDEX: 23.74 KG/M2 | HEIGHT: 63 IN | SYSTOLIC BLOOD PRESSURE: 152 MMHG | DIASTOLIC BLOOD PRESSURE: 80 MMHG | RESPIRATION RATE: 18 BRPM | TEMPERATURE: 97.3 F

## 2022-12-07 DIAGNOSIS — C50.912 LOBULAR CARCINOMA OF LEFT BREAST: Primary | ICD-10-CM

## 2022-12-07 DIAGNOSIS — Z15.02 BRCA2 GENE MUTATION POSITIVE IN FEMALE: ICD-10-CM

## 2022-12-07 DIAGNOSIS — Z15.01 BRCA2 GENE MUTATION POSITIVE IN FEMALE: ICD-10-CM

## 2022-12-07 DIAGNOSIS — Z15.09 BRCA2 GENE MUTATION POSITIVE IN FEMALE: ICD-10-CM

## 2022-12-07 PROCEDURE — 99214 OFFICE O/P EST MOD 30 MIN: CPT | Performed by: NURSE PRACTITIONER

## 2022-12-07 RX ORDER — FLUCONAZOLE 150 MG/1
TABLET ORAL
COMMUNITY
Start: 2022-11-02

## 2022-12-07 NOTE — PROGRESS NOTES
-Treatment as above     CHIEF COMPLAINT: 1.  Left breast cancer   2.  BRCA2 gene positive    Problem List:  Oncology/Hematology History Overview Note   1.  Stage IA Lobular carcinoma of left breast (CMS/HCC): History of multiple prior mammographic abnormalities with biopsies benign.  More recent mammogram in July worrisome and biopsy consistent with invasive lobular carcinoma.  Underwent lumpectomy and sentinel node biopsy 8/8/18 with Dr. Forbes.  This showed a 1.2 cm infiltrating lobular carcinoma grade 2 Stover Terry 6 out of 7 with 0 out of 1 sentinel nodes involved and negative margins.  ER 95% 3+ positive OH 95% 3+ positive and HER-2/will 0+.  Stage 1a, T1c N0 M0.  I saw for the first time 8/28/18 and ordered Oncotype and the recurrence score was 20 with a 10 year risk of recurrence distantly predicted at 13%.  Will need bilateral mastectomies and at least 5 years of Arimidex.  The only staging to perform would be CBC and CMP with this early stage carcinoma.  With lobular carcinoma and a history of multiple prior mammographic abnormalities which presently are considered benign, she will need close mammographic follow-up.  BRCA2 gene mutation came back positive.  She has had prophylactic bilateral mastectomies with Dr. Forbes 10/1/2018.  2. Personal history of having her ovaries removed in her 40's.     -1/22/2021 established care with high risk GI clinic at [facility] in light of her BRCA mutation and first-degree relative (sister) with pancreatic cancer.     Lobular carcinoma of left breast (HCC)   8/28/2018 Initial Diagnosis         9/7/2018 Imaging    CT abdomen and pelvis showed lumpectomy site and fatty liver but no abnormal masses or adenopathy or metastases.     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      9/14/2018 -  Hormonal Therapy    Arimidex     9/14/2018 -  Other Event    CMP  glucose 169, BUN 18, creatinine 0.65, potassium 4.0, AST 49, ALT 72, alkaline phosphatase 179.       9/21/2018 Imaging    Total body bone scan IMPRESSION:  1. Low-level changes typical of DJD of the thoracic spine, AC and SC  joints.  2. Small focus of low-level activity in the left ischium, which may also  be benign. Particularly if the patient has symptoms in this region,  consider dedicated imaging.     10/1/2018 Surgery    Surgery       Procedure:  Bilateral total mastectomies          1/8/2019 Imaging    X-Ray pelvis and hips:  Today's exam shows no significant asymmetry of the trabecular pattern of  the left ischium compared to right, no obvious lytic or blastic change  or periosteal reaction. No fracture or avulsion is seen. Pelvic bones  appear intact. SI joints and hip joints appear symmetric and well  maintained.         IMPRESSION:  Pelvis and hips appear within normal limits.         HISTORY OF PRESENT ILLNESS:  The patient is a 62 y.o. female, here for follow up on management of ER positive left breast cancer currently on adjuvant therapy with Arimidex with BRCA2 gene mutation.  Sigrid has been doing well since we saw her last overall.  Currently she is having some issues with sciatica that has flared up off and on over the years.  She thinks that she may have pulled something as she has been carrying her dog more lately.  She is doing home stretches and exercises that she learned in the past when she did physical therapy.  She is up-to-date with routine health care.  She follows with dermatology annually.  She is scheduled for colonoscopy in February.  She has her annual follow-up with the high risk GI clinic at the [facility] in January.  She has no new or concerning findings on chest wall exam and no new or concerning bony aches or pains.  She is tolerating Arimidex with no unusual side effects.    Past Medical History:   Diagnosis Date   • Abnormal blood chemistry    • Arthritis    •  "Bulging of intervertebral disc between L4 and L5    • Diabetes mellitus (HCC)    • Disease of thyroid gland    • High risk medication use     DRUG THERPAY FINDING   • Hyperlipidemia    • Hypertension    • Hypothyroidism    • IBS (irritable bowel syndrome)    • Iron deficiency anemia    • Leukocytosis    • Low back pain    • Malignant neoplasm of female breast (HCC)    • Type 2 diabetes mellitus (HCC)    • Vitamin D deficiency      Past Surgical History:   Procedure Laterality Date   • CHOLECYSTECTOMY  2011   • HYSTERECTOMY     • STEREOTACTIC BRAIN BIOPSY     • W[facility] [place]       No Known Allergies    Family History and Social History reviewed and changed as necessary    REVIEW OF SYSTEM:   Positive for current flareup of sciatica    PHYSICAL EXAM:  Well-developed, well-nourished healthy-appearing female in no distress.  No cervical, supraclavicular or axillary nodes palpable on exam.  Bilateral chest wall exam is benign status post bilateral mastectomies, no abnormal masses, nodules, rashes or lesions.  Abdomen is soft, nontender, nondistended    Vitals:    12/07/22 1052   BP: 152/80   Pulse: 83   Resp: 18   Temp: 97.3 °F (36.3 °C)   SpO2: 98%   Weight: 60.8 kg (134 lb)   Height: 159.4 cm (62.75\")     Vitals:    12/07/22 1052   PainSc:   8   PainLoc: Back  Comment: lower back and sciatica down left side          ECOG score: 0           Vitals reviewed.    ECOG: (0) Fully Active - Able to Carry On All Pre-disease Performance Without Restriction        ASSESSMENT & PLAN:    1.  Stage IA Lobular carcinoma of left breast, ER positive, currently on adjuvant Arimidex  2.  BRCA2 positive  3.  Family history of pancreatic cancer in sister  4.  Type II diabetes mellitus, insulin-dependent  5.  Sciatica    Discussion:  Sigrid overall is doing well.  She has no evidence of breast cancer recurrence on clinical exam and no new worrisome symptoms.  She continues to tolerate Arimidex with no " unusual side effects.  I think she is past due for bone density testing on I have asked her to reach out to her primary care provider to get that scheduled.  I would be glad to order if able, her insurance typically requires things to go through her PCP.  I asked her to notify me if there are any issues or if she needs for me to place the order.  Otherwise she will continue Arimidex for a planned 10 years until September 2028.  She is currently having a flareup of sciatica which is not a new issue for her.  I did remind her to notify us if the pain does not improve with time and we could do further imaging.  She is now following with the high risk GI clinic at the [facility] in light of her BRCA2 positivity and first-degree relative (sister) who had pancreatic cancer.  She sees them again in January.  She also will be having her colonoscopy in February.  She continues with annual skin checks with dermatology.    Return to clinic in 1 year for follow-up    This was a level 4, moderate MDM visit with 2 stable chronic illnesses and prescription drug management.    Jessica Bernard, APRN    12/07/2022

## 2023-01-09 ENCOUNTER — TELEPHONE (OUTPATIENT)
Dept: FAMILY MEDICINE CLINIC | Facility: CLINIC | Age: 63
End: 2023-01-09

## 2023-01-09 NOTE — TELEPHONE ENCOUNTER
PATIENT CALLED REQUESTING A REFERRAL CALLED IN FOR AN ABDOMINAL MRI AT . SAID SHE HAS AN APPOINTMENT WITH DOCTOR ON JAN 30 AT 1:30...NEEDING MRI REFERRAL BEFORE THEN SO DOCTOR CAN GO OVER RESULTS.   SHE STATED SHE HAS CALLED FOR REFERRAL BEFORE AND NEVER RECEIVED IT.

## 2023-01-10 NOTE — TELEPHONE ENCOUNTER
I am forward message to Dr. Partida to see if he can get the MRI of Abd, her Oncologist should be doing this but elena states wants this through PCP, for follow up on HX of Breast cancer and Family HX of pancreas and Breast cancer. Please call pt [phone]

## 2023-01-24 ENCOUNTER — OFFICE VISIT (OUTPATIENT)
Dept: FAMILY MEDICINE CLINIC | Facility: CLINIC | Age: 63
End: 2023-01-24
Payer: OTHER GOVERNMENT

## 2023-01-24 VITALS
RESPIRATION RATE: 12 BRPM | OXYGEN SATURATION: 97 % | HEIGHT: 63 IN | DIASTOLIC BLOOD PRESSURE: 80 MMHG | WEIGHT: 134 LBS | SYSTOLIC BLOOD PRESSURE: 128 MMHG | BODY MASS INDEX: 23.74 KG/M2 | TEMPERATURE: 98.6 F | HEART RATE: 76 BPM

## 2023-01-24 DIAGNOSIS — F43.23 ADJUSTMENT DISORDER WITH MIXED ANXIETY AND DEPRESSED MOOD: ICD-10-CM

## 2023-01-24 DIAGNOSIS — E11.9 TYPE 2 DIABETES MELLITUS WITHOUT COMPLICATION, WITHOUT LONG-TERM CURRENT USE OF INSULIN: ICD-10-CM

## 2023-01-24 DIAGNOSIS — I10 BENIGN ESSENTIAL HYPERTENSION: ICD-10-CM

## 2023-01-24 DIAGNOSIS — Z00.00 PHYSICAL EXAM: Primary | ICD-10-CM

## 2023-01-24 DIAGNOSIS — C50.919 MALIGNANT NEOPLASM OF FEMALE BREAST, UNSPECIFIED ESTROGEN RECEPTOR STATUS, UNSPECIFIED LATERALITY, UNSPECIFIED SITE OF BREAST: ICD-10-CM

## 2023-01-24 DIAGNOSIS — E78.2 MIXED HYPERLIPIDEMIA: ICD-10-CM

## 2023-01-24 PROBLEM — Z79.899 HIGH RISK MEDICATION USE: Status: ACTIVE | Noted: 2023-01-24

## 2023-01-24 PROBLEM — E55.9 VITAMIN D DEFICIENCY: Status: ACTIVE | Noted: 2023-01-24

## 2023-01-24 PROBLEM — D72.829 LEUKOCYTOSIS: Status: ACTIVE | Noted: 2023-01-24

## 2023-01-24 PROBLEM — M54.9 BACK PAIN, CHRONIC: Status: ACTIVE | Noted: 2023-01-24

## 2023-01-24 PROBLEM — G89.29 BACK PAIN, CHRONIC: Status: ACTIVE | Noted: 2023-01-24

## 2023-01-24 PROBLEM — E07.9 DISEASE OF THYROID GLAND: Status: ACTIVE | Noted: 2023-01-24

## 2023-01-24 PROBLEM — J45.909 AIRWAY HYPERREACTIVITY: Status: ACTIVE | Noted: 2023-01-24

## 2023-01-24 PROBLEM — M19.90 ARTHRITIS: Status: ACTIVE | Noted: 2023-01-24

## 2023-01-24 PROCEDURE — 90471 IMMUNIZATION ADMIN: CPT | Performed by: FAMILY MEDICINE

## 2023-01-24 PROCEDURE — 99396 PREV VISIT EST AGE 40-64: CPT | Performed by: FAMILY MEDICINE

## 2023-01-24 PROCEDURE — 90715 TDAP VACCINE 7 YRS/> IM: CPT | Performed by: FAMILY MEDICINE

## 2023-01-24 RX ORDER — SERTRALINE HYDROCHLORIDE 25 MG/1
25 TABLET, FILM COATED ORAL DAILY
Qty: 90 TABLET | Refills: 1 | Status: SHIPPED | OUTPATIENT
Start: 2023-01-24

## 2023-01-24 RX ORDER — LORAZEPAM 0.5 MG/1
0.5 TABLET ORAL EVERY 8 HOURS PRN
Qty: 30 TABLET | Refills: 2 | Status: SHIPPED | OUTPATIENT
Start: 2023-01-24 | End: 2023-01-24 | Stop reason: SDUPTHER

## 2023-01-24 RX ORDER — SERTRALINE HYDROCHLORIDE 25 MG/1
25 TABLET, FILM COATED ORAL DAILY
Qty: 90 TABLET | Refills: 1 | Status: SHIPPED | OUTPATIENT
Start: 2023-01-24 | End: 2023-01-24 | Stop reason: SDUPTHER

## 2023-01-24 RX ORDER — LORAZEPAM 0.5 MG/1
0.5 TABLET ORAL EVERY 8 HOURS PRN
Qty: 30 TABLET | Refills: 2 | Status: SHIPPED | OUTPATIENT
Start: 2023-01-24

## 2023-01-24 NOTE — ASSESSMENT & PLAN NOTE
Her care [facility].  She also has a family history of pancreatic cancer will need abdominal ultrasound.  Will let [facility] order this.

## 2023-01-24 NOTE — PROGRESS NOTES
Patient Name: Sigrid Holder  : 1960   MRN: [id number]     Chief Complaint:    Chief Complaint   Patient presents with   • Annual Exam     Pt here for yearly exam   • Hypothyroidism   • Hypertension   • Hyperlipidemia   • Diabetes       History of Present Illness: Sigrid Holder is a 62 y.o. female who is here today for their annual health maintenance and physical.          Review of Systems:   Review of Systems   Constitutional: Negative.    HENT: Negative.    Eyes: Negative.    Respiratory: Negative.    Cardiovascular: Negative.    Gastrointestinal: Negative.    Musculoskeletal: Negative.    Skin: Negative.    Neurological: Negative.    Hematological: Negative.    Psychiatric/Behavioral: Negative.        Past Medical History, Social History, Family History and Care Team were all reviewed with patient and updated as appropriate.     Medications:     Current Outpatient Medications:   •  acyclovir (ZOVIRAX) 400 MG tablet, Take 1 tablet by mouth Daily As Needed., Disp: , Rfl:   •  ADVAIR DISKUS 250-50 MCG/DOSE DISKUS, , Disp: , Rfl:   •  anastrozole (ARIMIDEX) 1 MG tablet, Take 1 tablet by mouth Daily., Disp: 90 tablet, Rfl: 3  •  ascorbic acid (VITAMIN C) 1000 MG tablet, , Disp: , Rfl:   •  Aspirin Buf,CaCarb-MgCarb-MgO, 81 MG tablet, Take  by mouth., Disp: , Rfl:   •  calcium carb-cholecalciferol 600-800 MG-UNIT tablet, Take  by mouth., Disp: , Rfl:   •  capsaicin (ZOSTRIX) 0.025 % cream, Apply  topically to the appropriate area as directed Every 8 (Eight) Hours., Disp: , Rfl:   •  cetirizine (zyrTEC) 10 MG tablet, , Disp: , Rfl:   •  Cholecalciferol (Vitamin D3) 1.25 MG (56935 UT) capsule, , Disp: , Rfl:   •  conjugated estrogens (PREMARIN) 0.625 MG/GM vaginal cream, Insert  into the vagina., Disp: , Rfl:   •  desloratadine (CLARINEX) 5 MG tablet, , Disp: , Rfl:   •  dexlansoprazole (DEXILANT) 60 MG capsule, Take  by mouth Daily., Disp: , Rfl:   •  dicyclomine (BENTYL) 10 MG capsule, Take  by  mouth 2 (Two) Times a Day., Disp: , Rfl:   •  Docusate Sodium 100 MG capsule, Take  by mouth Daily., Disp: , Rfl:   •  Dulaglutide 1.5 MG/0.5ML solution pen-injector, Inject  under the skin into the appropriate area as directed 1 (One) Time Per Week., Disp: , Rfl:   •  empagliflozin (JARDIANCE) 25 MG tablet tablet, Take  by mouth., Disp: , Rfl:   •  exenatide er (BYDUREON) 2 MG pen-injector injection, Inject  under the skin into the appropriate area as directed., Disp: , Rfl:   •  fluconazole (DIFLUCAN) 150 MG tablet, , Disp: , Rfl:   •  gabapentin (NEURONTIN) 300 MG capsule, , Disp: , Rfl:   •  insulin NPH (humuLIN N,novoLIN N) 100 UNIT/ML injection, , Disp: , Rfl:   •  levothyroxine (SYNTHROID, LEVOTHROID) 88 MCG tablet, Take 1 tablet by mouth Daily., Disp: , Rfl:   •  LORazepam (ATIVAN) 0.5 MG tablet, Take 1 tablet by mouth Every 8 (Eight) Hours As Needed for Anxiety., Disp: 30 tablet, Rfl: 2  •  metFORMIN (GLUCOPHAGE) 850 MG tablet, Take 1 tablet by mouth Every 12 (Twelve) Hours., Disp: , Rfl:   •  Misc. Devices (VAGINAL CREAM APPLICATOR) misc, Insert vaginally with 5 clicks = 1 gram every other day, Disp: 15 each, Rfl: 3  •  montelukast (SINGULAIR) 10 MG tablet, , Disp: , Rfl:   •  Multiple Vitamins-Minerals (CENTRUM SILVER 50+WOMEN PO), Take  by mouth Daily., Disp: , Rfl:   •  NIFEdipine CC (ADALAT CC) 30 MG 24 hr tablet, , Disp: , Rfl:   •  pantoprazole (PROTONIX) 40 MG EC tablet, , Disp: , Rfl:   •  pravastatin (PRAVACHOL) 40 MG tablet, Take  by mouth., Disp: , Rfl:   •  PROAIR  (90 Base) MCG/ACT inhaler, , Disp: , Rfl:   •  telmisartan (MICARDIS) 40 MG tablet, Take  by mouth Daily., Disp: , Rfl:   •  traZODone (DESYREL) 50 MG tablet, Take 50 mg by mouth Every Night., Disp: , Rfl:   •  Vascepa 1 g capsule capsule, TAKE 2 CAPSULES TWICE A DAY WITH FOOD SWALLOWING WHOLE. DO NOT CHEW, OPEN, DISSOLVE AND / OR CRUSH, Disp: 360 capsule, Rfl: 3  •  Xylitol 500 MG disk, XyliMelts 500 MG Mouth/Throat Disk;  Patient Sig: XyliMelts 500 MG Mouth/Throat Disk 1 IN THE EVENING; 0; 05-Feb-2015; Active, Disp: , Rfl:   •  ZETONNA 37 MCG/ACT aerosol solution, , Disp: , Rfl:   •  sertraline (Zoloft) 25 MG tablet, Take 1 tablet by mouth Daily., Disp: 90 tablet, Rfl: 1    Current Facility-Administered Medications:   •  BREAST PROSTHESIS misc 1 Bag., 1 Bag., Does not apply, Daily, Jessica Bernard, APRN  •  BREAST PROSTHESIS misc 2 Units, 2 Units, Does not apply, PRN, Marco AntonioJessica lane, APRN    Allergies:   Allergies   Allergen Reactions   • Liraglutide Nausea And Vomiting       Health Maintenance   Topic Date Due   • ZOSTER VACCINE (1 of 2) Never done   • HEPATITIS C SCREENING  Never done   • PAP SMEAR  Never done   • COVID-19 Vaccine (5 - Booster for Moderna series) 12/21/2022   • DIABETIC EYE EXAM  02/24/2023 (Originally 8/28/2018)   • LIPID PANEL  02/09/2023   • HEMOGLOBIN A1C  07/12/2023   • DIABETIC FOOT EXAM  01/12/2024   • URINE MICROALBUMIN  01/12/2024   • ANNUAL PHYSICAL  01/24/2024   • COLORECTAL CANCER SCREENING  10/19/2027   • TDAP/TD VACCINES (3 - Td or Tdap) 01/24/2033   • Pneumococcal Vaccine 0-64  Completed   • INFLUENZA VACCINE  Completed        PHQ-2 Total Score:         Intimate partner violence: (Screen on initial visit, pregnant women, women with injuries, older adult with injury or evidence of neglect):  • Violence can be a problem in many people's lives, so I now ask every patient about trauma or abuse they may have experienced in a relationship.  • Stress/Safety - Do you feel safe in your relationship?  • Afraid/Abused - Have you ever been in a relationship where you were threatened, hurt, or afraid?  • Friend/Family - Are your friends aware you have been hurt?  • Emergency Plan - Do you have a safe place to go and the resources you need in an emergency?    Osteoporosis:   • Ost menopausal women < 65 with RF (advancing age, previous fracture, glucocorticoid therapy, parental hip fracture, low body weight, current  "cigarette smoking, excessive alcohol consumption, rheumatoid arthritis, secondary osteoporosis [hypogonadism/premature menopause, malabsorption, chronic liver disease, IBD]).  • All women 65 or older      Physical Exam:  Vital Signs:   Vitals:    01/24/23 0832   BP: 128/80   BP Location: Left arm   Patient Position: Sitting   Cuff Size: Adult   Pulse: 76   Resp: 12   Temp: 98.6 °F (37 °C)   TempSrc: Temporal   SpO2: 97%   Weight: 60.8 kg (134 lb)   Height: 159.4 cm (62.75\")   PainSc: 0-No pain     Body mass index is 23.93 kg/m².     Physical Exam  Vitals and nursing note reviewed.   Constitutional:       Appearance: Normal appearance. She is normal weight.   HENT:      Head: Normocephalic and atraumatic.      Right Ear: Tympanic membrane, ear canal and external ear normal.      Left Ear: Tympanic membrane, ear canal and external ear normal.      Nose: Nose normal.      Mouth/Throat:      Mouth: Mucous membranes are moist.      Pharynx: Oropharynx is clear.   Eyes:      Extraocular Movements: Extraocular movements intact.      Conjunctiva/sclera: Conjunctivae normal.      Pupils: Pupils are equal, round, and reactive to light.   Cardiovascular:      Rate and Rhythm: Normal rate and regular rhythm.      Pulses: Normal pulses.      Heart sounds: Normal heart sounds.   Pulmonary:      Effort: Pulmonary effort is normal.      Breath sounds: Normal breath sounds.   Abdominal:      General: Abdomen is flat. Bowel sounds are normal.      Palpations: Abdomen is soft.   Musculoskeletal:         General: Normal range of motion.      Cervical back: Normal range of motion and neck supple.   Feet:      Comments:      Skin:     General: Skin is warm and dry.   Neurological:      General: No focal deficit present.      Mental Status: She is alert and oriented to person, place, and time.   Psychiatric:         Mood and Affect: Mood normal.         Behavior: Behavior normal.         Thought Content: Thought content normal.         " Judgment: Judgment normal.         Procedures      Assessment/Plan:   Diagnoses and all orders for this visit:    1. Physical exam (Primary)  -     Hepatitis C Antibody; Future    2. Mixed hyperlipidemia  Assessment & Plan:  Blood work good.  Recheck in 6 months      3. Benign essential hypertension  Assessment & Plan:  Discussed with patient to monitor their blood pressure and if systolic blood pressure goes above 140 or diastolic is above 90 to return to clinic.  Take medicines as directed, call for any problems, patient not having or any worrisome symptoms.          4. Type 2 diabetes mellitus without complication, without long-term current use of insulin (HCC)  Assessment & Plan:  A1c 6.1.  We will follow.      5. Malignant neoplasm of female breast, unspecified estrogen receptor status, unspecified laterality, unspecified site of breast (HCC)  Assessment & Plan:  Her care [facility].  She also has a family history of pancreatic cancer will need abdominal ultrasound.  Will let [facility] order this.    Orders:  -     Ambulatory Referral to Hematology / Oncology    6. Adjustment disorder with mixed anxiety and depressed mood  Assessment & Plan:  Patient is missing her sister and the anniversary of her death was of November.  We are going to refill her Ativan and restart the sertraline.  Recheck in 6 months.    Orders:  -     LORazepam (ATIVAN) 0.5 MG tablet; Take 1 tablet by mouth Every 8 (Eight) Hours As Needed for Anxiety.  Dispense: 30 tablet; Refill: 2    Other orders  -     Tdap Vaccine Greater Than or Equal To 6yo IM  -     sertraline (Zoloft) 25 MG tablet; Take 1 tablet by mouth Daily.  Dispense: 90 tablet; Refill: 1           Follow Up:   No follow-ups on file.    Healthcare Maintenance:   Counseling provided on .   Sigrid Holder voices understanding and acceptance of this advice and will call back with any further questions or concerns. AVS with preventive healthcare tips printed  for patient.     Lopez Partida MD  [facility]  Answers for HPI/ROS submitted by the patient on 1/23/2023  What is the primary reason for your visit?: Physical

## 2023-01-24 NOTE — ASSESSMENT & PLAN NOTE
Patient is missing her sister and the anniversary of her death was of November.  We are going to refill her Ativan and restart the sertraline.  Recheck in 6 months.

## 2023-02-20 NOTE — SIGNIFICANT NOTE
Education provided the Patient on the following:    - Nothing to Eat or Drink after MN the night before the procedure    - Avoid red/purple fluids while completing their bowel prep as ordered by physician  -Contact Gastrointerologist office for any questions about specific details regarding colon prep    -You will need to have someone drive you home after your colonoscopy and remain with you for 24 hours after the procedure  - The date of your Surgery, you may have one visitor at bedside or within 10-15 minutes of [facility]  -Please wear warm socks when you arrive for your colonoscopy  -Remove all jewelry and leave any valuables before arriving the day of your procedure (all will have to be removed before leaving preop)  -You will need to arrive at 0745  on 2/22 for your colonoscopy    -Feel free to contact us at: [phone] with any additional questions/concerns

## 2023-02-22 ENCOUNTER — ANESTHESIA (OUTPATIENT)
Dept: SURGERY | Facility: SURGERY CENTER | Age: 63
End: 2023-02-22
Payer: OTHER GOVERNMENT

## 2023-02-22 ENCOUNTER — HOSPITAL ENCOUNTER (OUTPATIENT)
Facility: SURGERY CENTER | Age: 63
Setting detail: HOSPITAL OUTPATIENT SURGERY
Discharge: HOME | End: 2023-02-22
Attending: INTERNAL MEDICINE | Admitting: INTERNAL MEDICINE
Payer: OTHER GOVERNMENT

## 2023-02-22 ENCOUNTER — ANESTHESIA EVENT (OUTPATIENT)
Dept: SURGERY | Facility: SURGERY CENTER | Age: 63
End: 2023-02-22
Payer: OTHER GOVERNMENT

## 2023-02-22 VITALS
RESPIRATION RATE: 16 BRPM | SYSTOLIC BLOOD PRESSURE: 156 MMHG | WEIGHT: 128 LBS | HEART RATE: 81 BPM | HEIGHT: 63 IN | BODY MASS INDEX: 22.68 KG/M2 | DIASTOLIC BLOOD PRESSURE: 65 MMHG | TEMPERATURE: 98.7 F | OXYGEN SATURATION: 96 %

## 2023-02-22 DIAGNOSIS — Z86.010 PERSONAL HISTORY OF COLONIC POLYPS: ICD-10-CM

## 2023-02-22 LAB — GLUCOSE BLDC GLUCOMTR-MCNC: 128 MG/DL (ref 70–130)

## 2023-02-22 PROCEDURE — 25010000002 PROPOFOL 10 MG/ML EMULSION: Performed by: ANESTHESIOLOGY

## 2023-02-22 PROCEDURE — 45380 COLONOSCOPY AND BIOPSY: CPT | Performed by: INTERNAL MEDICINE

## 2023-02-22 PROCEDURE — 88305 TISSUE EXAM BY PATHOLOGIST: CPT | Performed by: INTERNAL MEDICINE

## 2023-02-22 PROCEDURE — 0 LIDOCAINE 1 % SOLUTION: Performed by: ANESTHESIOLOGY

## 2023-02-22 RX ORDER — LIDOCAINE HYDROCHLORIDE 10 MG/ML
0.5 INJECTION, SOLUTION INFILTRATION; PERINEURAL ONCE AS NEEDED
Status: DISCONTINUED | OUTPATIENT
Start: 2023-02-22 | End: 2023-02-22 | Stop reason: HOSPADM

## 2023-02-22 RX ORDER — SODIUM CHLORIDE, SODIUM LACTATE, POTASSIUM CHLORIDE, CALCIUM CHLORIDE 600; 310; 30; 20 MG/100ML; MG/100ML; MG/100ML; MG/100ML
1000 INJECTION, SOLUTION INTRAVENOUS CONTINUOUS
Status: DISCONTINUED | OUTPATIENT
Start: 2023-02-22 | End: 2023-02-22 | Stop reason: HOSPADM

## 2023-02-22 RX ORDER — LIDOCAINE HYDROCHLORIDE 10 MG/ML
INJECTION, SOLUTION INFILTRATION; PERINEURAL AS NEEDED
Status: DISCONTINUED | OUTPATIENT
Start: 2023-02-22 | End: 2023-02-22 | Stop reason: SURG

## 2023-02-22 RX ORDER — PROPOFOL 10 MG/ML
VIAL (ML) INTRAVENOUS AS NEEDED
Status: DISCONTINUED | OUTPATIENT
Start: 2023-02-22 | End: 2023-02-22 | Stop reason: SURG

## 2023-02-22 RX ORDER — SODIUM CHLORIDE 0.9 % (FLUSH) 0.9 %
10 SYRINGE (ML) INJECTION AS NEEDED
Status: DISCONTINUED | OUTPATIENT
Start: 2023-02-22 | End: 2023-02-22 | Stop reason: HOSPADM

## 2023-02-22 RX ADMIN — LIDOCAINE HYDROCHLORIDE 30 MG: 10 INJECTION, SOLUTION INFILTRATION; PERINEURAL at 09:12

## 2023-02-22 RX ADMIN — PROPOFOL 140 MCG/KG/MIN: 10 INJECTION, EMULSION INTRAVENOUS at 09:12

## 2023-02-22 RX ADMIN — SODIUM CHLORIDE, POTASSIUM CHLORIDE, SODIUM LACTATE AND CALCIUM CHLORIDE 1000 ML: 600; 310; 30; 20 INJECTION, SOLUTION INTRAVENOUS at 08:12

## 2023-02-22 RX ADMIN — PROPOFOL 30 MG: 10 INJECTION, EMULSION INTRAVENOUS at 09:14

## 2023-02-22 RX ADMIN — PROPOFOL 70 MG: 10 INJECTION, EMULSION INTRAVENOUS at 09:12

## 2023-02-22 NOTE — ANESTHESIA POSTPROCEDURE EVALUATION
"Patient: Sigrid Holder    Procedure Summary     Date: 02/22/23 Room / Location: [facility] OR  / [facility]    Anesthesia Start: 0902 Anesthesia Stop: 0941    Procedure: COLONOSCOPY Diagnosis:       Personal history of colonic polyps      Colon polyp      (Personal history of colonic polyps [Z86.010])    Surgeons: Alexander Delcid MD Provider: Arnaud Spivey MD    Anesthesia Type: MAC ASA Status: 3          Anesthesia Type: MAC    Vitals  Vitals Value Taken Time   /98 02/22/23 0944   Temp 37.1 °C (98.7 °F) 02/22/23 0939   Pulse 83 02/22/23 0944   Resp 16 02/22/23 0944   SpO2 95 % 02/22/23 0944           Post Anesthesia Care and Evaluation    Patient location during evaluation: bedside  Patient participation: complete - patient participated  Level of consciousness: awake and alert  Pain management: adequate    Airway patency: patent  Anesthetic complications: No anesthetic complications  PONV Status: controlled  Cardiovascular status: acceptable  Respiratory status: acceptable  Hydration status: acceptable    Comments: /98   Pulse 83   Temp 37.1 °C (98.7 °F) (Temporal)   Resp 16   Ht 160 cm (63\")   Wt 58.1 kg (128 lb)   SpO2 95%   BMI 22.67 kg/m²         "

## 2023-02-22 NOTE — H&P
Patient Care Team:  Lopez Partida MD as PCP - General  Minh Forbes MD as Consulting Physician (Vascular Surgery)  Raimundo Zimmerman MD as Consulting Physician (Radiation Oncology)  Jose M Degroot MD as Consulting Physician (Dermatology)    CHIEF COMPLAINT: Personal hx colon polyps    HISTORY OF PRESENT ILLNESS:  Last exam was     Past Medical History:   Diagnosis Date   • Abnormal blood chemistry    • Arthritis    • Asthma    • Bulging of intervertebral disc between L4 and L5    • Diabetes mellitus (HCC)    • Disease of thyroid gland    • GERD (gastroesophageal reflux disease)    • High risk medication use     DRUG THERPAY FINDING   • History of transfusion    • Hyperlipidemia    • Hypertension    • Hypothyroidism    • IBS (irritable bowel syndrome)    • Iron deficiency anemia    • Leukocytosis    • Low back pain    • Malignant neoplasm of female breast (HCC)    • Sleep apnea    • Type 2 diabetes mellitus (HCC)    • Vitamin D deficiency      Past Surgical History:   Procedure Laterality Date   • CHOLECYSTECTOMY     • HYSTERECTOMY     • STEREOTACTIC BRAIN BIOPSY     • WRIST SURGERY Right     [facility] [place]     Family History   Problem Relation Age of Onset   • Breast cancer Mother    • Stroke Father    • Heart disease Father    • Pancreatic cancer Sister      Social History     Tobacco Use   • Smoking status: Former     Packs/day: 1.00     Years: 7.00     Pack years: 7.00     Types: Cigarettes     Start date:      Quit date:      Years since quittin.1   • Smokeless tobacco: Never   Vaping Use   • Vaping Use: Never used   Substance Use Topics   • Alcohol use: Not Currently     Comment: DO NOT DRINK ANYMORE   • Drug use: Never     Facility-Administered Medications Prior to Admission   Medication Dose Route Frequency Provider Last Rate Last Admin   • BREAST PROSTHESIS misc 1 Bag.  1 Bag. Does not apply Daily Jessica Bernard APRN       • BREAST PROSTHESIS misc 2 Units  2 Units  Does not apply PRN Jessica Bernard APRN         Medications Prior to Admission   Medication Sig Dispense Refill Last Dose   • acyclovir (ZOVIRAX) 400 MG tablet Take 1 tablet by mouth Daily As Needed.      • ADVAIR DISKUS 250-50 MCG/DOSE DISKUS       • anastrozole (ARIMIDEX) 1 MG tablet Take 1 tablet by mouth Daily. 90 tablet 3    • ascorbic acid (VITAMIN C) 1000 MG tablet       • Aspirin Buf,CaCarb-MgCarb-MgO, 81 MG tablet Take  by mouth.      • calcium carb-cholecalciferol 600-800 MG-UNIT tablet Take  by mouth.      • cetirizine (zyrTEC) 10 MG tablet       • desloratadine (CLARINEX) 5 MG tablet       • dexlansoprazole (DEXILANT) 60 MG capsule Take  by mouth Daily.      • dicyclomine (BENTYL) 10 MG capsule Take  by mouth 2 (Two) Times a Day.      • Docusate Sodium 100 MG capsule Take  by mouth Daily.      • Dulaglutide 1.5 MG/0.5ML solution pen-injector Inject  under the skin into the appropriate area as directed 1 (One) Time Per Week.      • empagliflozin (JARDIANCE) 25 MG tablet tablet Take  by mouth.      • exenatide er (BYDUREON) 2 MG pen-injector injection Inject  under the skin into the appropriate area as directed.      • fluconazole (DIFLUCAN) 150 MG tablet       • gabapentin (NEURONTIN) 300 MG capsule       • INSULIN GLARGINE-YFGN SC Inject 18 Units under the skin into the appropriate area as directed Every Night.      • levothyroxine (SYNTHROID, LEVOTHROID) 88 MCG tablet Take 1 tablet by mouth Daily.      • LORazepam (ATIVAN) 0.5 MG tablet Take 1 tablet by mouth Every 8 (Eight) Hours As Needed for Anxiety. 30 tablet 2    • metFORMIN (GLUCOPHAGE) 850 MG tablet Take 1 tablet by mouth Every 12 (Twelve) Hours.      • montelukast (SINGULAIR) 10 MG tablet       • Multiple Vitamins-Minerals (CENTRUM SILVER 50+WOMEN PO) Take  by mouth Daily.      • NIFEdipine CC (ADALAT CC) 30 MG 24 hr tablet       • pantoprazole (PROTONIX) 40 MG EC tablet       • pravastatin (PRAVACHOL) 40 MG tablet Take  by mouth.      • PROAIR  " (90 Base) MCG/ACT inhaler       • sertraline (Zoloft) 25 MG tablet Take 1 tablet by mouth Daily. 90 tablet 1    • telmisartan (MICARDIS) 40 MG tablet Take  by mouth Daily.      • traZODone (DESYREL) 50 MG tablet Take 50 mg by mouth Every Night.      • Vascepa 1 g capsule capsule TAKE 2 CAPSULES TWICE A DAY WITH FOOD SWALLOWING WHOLE. DO NOT CHEW, OPEN, DISSOLVE AND / OR CRUSH 360 capsule 3    • Xylitol 500 MG disk XyliMelts 500 MG Mouth/Throat Disk; Patient Sig: XyliMelts 500 MG Mouth/Throat Disk 1 IN THE EVENING; 0; 05-Feb-2015; Active      • ZETONNA 37 MCG/ACT aerosol solution         Allergies:  Liraglutide    REVIEW OF SYSTEMS:  Please see the above history of present illness for pertinent positives and negatives.  The remainder of the patient's systems have been reviewed and are negative.     Vital Signs       Flowsheet Rows    Flowsheet Row First Filed Value   Admission Height 160 cm (63\") Documented at 02/20/2023 1454   Admission Weight 58.5 kg (129 lb) Documented at 02/20/2023 1454           Physical Exam:  Physical Exam   Constitutional: Patient appears well-developed and well-nourished and in no acute distress   HEENT:   Head: Normocephalic and atraumatic.   Eyes:  Pupils are equal, round, and reactive to light. EOM are intact. Sclerae are anicteric and non-injected.  Mouth and Throat: Patient has moist mucous membranes. Oropharynx is clear of any erythema or exudate.     Neck: Neck supple. No JVD present. No thyromegaly present. No lymphadenopathy present.  Cardiovascular: Regular rate, regular rhythm, S1 normal and S2 normal.  Exam reveals no gallop and no friction rub.  No murmur heard.  Pulmonary/Chest: Lungs are clear to auscultation bilaterally. No respiratory distress. No wheezes. No rhonchi. No rales.   Abdominal: Soft. Bowel sounds are normal. No distension and no mass. There is no hepatosplenomegaly. There is no tenderness.   Musculoskeletal: Normal Muscle tone  Extremities: No edema. " Pulses are palpable in all 4 extremities.  Neurological: Patient is alert and oriented to person, place, and time. Cranial nerves II-XII are grossly intact with no focal deficits.  Skin: Skin is warm. No rash noted. Nails show no clubbing.  No cyanosis or erythema.    Debilities/Disabilities Identified: None  Emotional Behavior: Appropriate     Results Review:   I reviewed the patient's new clinical results.    Lab Results (most recent)     None          Imaging Results (Most Recent)     None        reviewed    ECG/EMG Results (most recent)     None        reviewed    Assessment & Plan   Personal hx colon polyps/  colonoscopy      I discussed the patient's findings and my recommendations with patient.     Alexander Delcid MD  02/22/23  08:10 EST    Time: 10 min prior to procedure.

## 2023-02-22 NOTE — BRIEF OP NOTE
COLONOSCOPY  Progress Note    Sigrid Holder  2/22/2023    Pre-op Diagnosis:   Personal history of colonic polyps [Z86.010]       Post-Op Diagnosis Codes:     * Personal history of colonic polyps [Z86.010]     * Colon polyp [K63.5]    Procedure/CPT® Codes:        Procedure(s):  COLONOSCOPY        Surgeon(s):  Alexander Delcid MD    Anesthesia: Monitored Anesthesia Care    Staff:   Endo Technician: Marisel Mccord  Endo Nurse: Brandee Sahni RN         Estimated Blood Loss: none    Urine Voided: * No values recorded between 2/22/2023  9:02 AM and 2/22/2023  9:33 AM *    Specimens:                Specimens     ID Source Type Tests Collected By Collected At Frozen?    A Large Intestine, Right / Ascending Colon Tissue · TISSUE PATHOLOGY EXAM   Alexander Delcid MD 2/22/23 0950 No    Description: ascending polyp                Drains: * No LDAs found *    Findings: Colon to TI good Prep  Polyp-Biopsy        Complications: None          Alexander Delcid MD     Date: 2/22/2023  Time: 09:35 EST

## 2023-02-22 NOTE — ANESTHESIA PREPROCEDURE EVALUATION
Anesthesia Evaluation     Patient summary reviewed and Nursing notes reviewed     NPO Liquid Status: > 2 hours           Airway   Mallampati: II  TM distance: >3 FB  Neck ROM: full  No difficulty expected  Dental - normal exam     Pulmonary - normal exam   (+) asthma,sleep apnea on CPAP,   Cardiovascular - normal exam  Exercise tolerance: good (4-7 METS)    (+) hypertension, hyperlipidemia,       Neuro/Psych  (+) psychiatric history Depression and Anxiety,    GI/Hepatic/Renal/Endo    (+)  GERD,  diabetes mellitus type 2, thyroid problem hypothyroidism    Musculoskeletal     Abdominal    Substance History - negative use     OB/GYN negative ob/gyn ROS         Other   arthritis,    history of cancer                    Anesthesia Plan    ASA 3     MAC     intravenous induction     Anesthetic plan, risks, benefits, and alternatives have been provided, discussed and informed consent has been obtained with: patient.        CODE STATUS:

## 2023-02-23 LAB
LAB AP CASE REPORT: NORMAL
PATH REPORT.FINAL DX SPEC: NORMAL
PATH REPORT.GROSS SPEC: NORMAL

## 2023-05-20 DIAGNOSIS — E78.2 MIXED HYPERLIPIDEMIA: ICD-10-CM

## 2023-05-22 RX ORDER — ICOSAPENT ETHYL 1000 MG/1
CAPSULE ORAL
Qty: 360 CAPSULE | Refills: 1 | Status: SHIPPED | OUTPATIENT
Start: 2023-05-22

## 2023-05-22 NOTE — TELEPHONE ENCOUNTER
Rx Refill Note    Requested Prescriptions     Pending Prescriptions Disp Refills   • icosapent ethyl (VASCEPA) 1 g capsule capsule [Pharmacy Med Name: ICOSAPENT ETHYL CAPS 1GM] 360 capsule 3     Sig: TAKE 2 CAPSULES TWICE A DAY WITH FOOD SWALLOWING WHOLE. DO NOT CHEW, OPEN, DISSOLVE AND / OR CRUSH        Last office visit with prescribing clinician: 1/24/2023      Next office visit with prescribing clinician: 7/21/2023   Last labs:   Last refill:    [facility]

## 2023-11-11 DIAGNOSIS — E78.2 MIXED HYPERLIPIDEMIA: ICD-10-CM

## 2023-11-13 RX ORDER — ICOSAPENT ETHYL 1000 MG/1
CAPSULE ORAL
Qty: 360 CAPSULE | Refills: 3 | Status: SHIPPED | OUTPATIENT
Start: 2023-11-13

## 2023-12-06 ENCOUNTER — TELEPHONE (OUTPATIENT)
Dept: FAMILY MEDICINE CLINIC | Facility: CLINIC | Age: 63
End: 2023-12-06
Payer: OTHER GOVERNMENT

## 2023-12-06 DIAGNOSIS — C50.919 MALIGNANT NEOPLASM OF FEMALE BREAST, UNSPECIFIED ESTROGEN RECEPTOR STATUS, UNSPECIFIED LATERALITY, UNSPECIFIED SITE OF BREAST: Primary | ICD-10-CM

## 2023-12-06 NOTE — TELEPHONE ENCOUNTER
Pt called. She needs referral placed for dr jenkins at [facility]    She said dr jenkins at [facility] as well as [facility].    See sees dr alice Lopez on 12/13/23. She has  and needs this referral worked asap so insurance will cover upcoming appt.    Please ask dr fisher to place order and let dhaval know    Call pt can leave message  [phone]

## 2023-12-13 ENCOUNTER — OFFICE VISIT (OUTPATIENT)
Dept: ONCOLOGY | Facility: CLINIC | Age: 63
End: 2023-12-13
Payer: OTHER GOVERNMENT

## 2023-12-13 VITALS
OXYGEN SATURATION: 97 % | BODY MASS INDEX: 23.74 KG/M2 | HEART RATE: 95 BPM | DIASTOLIC BLOOD PRESSURE: 77 MMHG | RESPIRATION RATE: 18 BRPM | TEMPERATURE: 97.7 F | SYSTOLIC BLOOD PRESSURE: 132 MMHG | WEIGHT: 134 LBS | HEIGHT: 63 IN

## 2023-12-13 DIAGNOSIS — C50.912 MALIGNANT NEOPLASM OF LEFT BREAST IN FEMALE, ESTROGEN RECEPTOR POSITIVE, UNSPECIFIED SITE OF BREAST: Primary | ICD-10-CM

## 2023-12-13 DIAGNOSIS — Z17.0 MALIGNANT NEOPLASM OF LEFT BREAST IN FEMALE, ESTROGEN RECEPTOR POSITIVE, UNSPECIFIED SITE OF BREAST: Primary | ICD-10-CM

## 2023-12-13 PROCEDURE — 99213 OFFICE O/P EST LOW 20 MIN: CPT | Performed by: NURSE PRACTITIONER

## 2024-01-03 ENCOUNTER — TELEPHONE (OUTPATIENT)
Dept: FAMILY MEDICINE CLINIC | Facility: CLINIC | Age: 64
End: 2024-01-03

## 2024-01-03 NOTE — TELEPHONE ENCOUNTER
Caller: Sigrid Holder    Relationship: Self    Best call back number: [phone]    What is the medical concern/diagnosis: ANNUAL ABDOMINAL MRI    What specialty or service is being requested: [facility]    What is the provider, practice or medical service name: [facility]    What is the office location: [address] [place] [address] [phone]    Any additional details: PATIENT'S REFERRAL EXPIRES ON 01/24/2024 AND PATIENT APPOINTMENT IS ON 01/29/2024 @[facility]

## 2024-01-11 ENCOUNTER — LAB (OUTPATIENT)
Dept: FAMILY MEDICINE CLINIC | Facility: CLINIC | Age: 64
End: 2024-01-11
Payer: OTHER GOVERNMENT

## 2024-01-11 DIAGNOSIS — E03.9 ACQUIRED HYPOTHYROIDISM: ICD-10-CM

## 2024-01-11 DIAGNOSIS — E78.2 MIXED HYPERLIPIDEMIA: ICD-10-CM

## 2024-01-11 DIAGNOSIS — E55.9 VITAMIN D DEFICIENCY: ICD-10-CM

## 2024-01-11 DIAGNOSIS — E11.9 TYPE 2 DIABETES MELLITUS WITHOUT COMPLICATION, WITHOUT LONG-TERM CURRENT USE OF INSULIN: ICD-10-CM

## 2024-01-11 DIAGNOSIS — D72.829 LEUKOCYTOSIS, UNSPECIFIED TYPE: ICD-10-CM

## 2024-01-11 DIAGNOSIS — I10 BENIGN ESSENTIAL HYPERTENSION: ICD-10-CM

## 2024-01-11 DIAGNOSIS — F43.23 ADJUSTMENT DISORDER WITH MIXED ANXIETY AND DEPRESSED MOOD: ICD-10-CM

## 2024-01-11 PROCEDURE — 36415 COLL VENOUS BLD VENIPUNCTURE: CPT | Performed by: FAMILY MEDICINE

## 2024-01-12 LAB
25(OH)D3+25(OH)D2 SERPL-MCNC: 52.7 NG/ML (ref 30–100)
ALBUMIN SERPL-MCNC: 4.7 G/DL (ref 3.9–4.9)
ALBUMIN/GLOB SERPL: 1.8 {RATIO} (ref 1.2–2.2)
ALP SERPL-CCNC: 119 IU/L (ref 44–121)
ALT SERPL-CCNC: 26 IU/L (ref 0–32)
AST SERPL-CCNC: 26 IU/L (ref 0–40)
BASOPHILS # BLD AUTO: 0.1 X10E3/UL (ref 0–0.2)
BASOPHILS NFR BLD AUTO: 1 %
BILIRUB SERPL-MCNC: 0.2 MG/DL (ref 0–1.2)
BUN SERPL-MCNC: 12 MG/DL (ref 8–27)
BUN/CREAT SERPL: 18 (ref 12–28)
CALCIUM SERPL-MCNC: 9.7 MG/DL (ref 8.7–10.3)
CHLORIDE SERPL-SCNC: 99 MMOL/L (ref 96–106)
CHOLEST SERPL-MCNC: 134 MG/DL (ref 100–199)
CO2 SERPL-SCNC: 25 MMOL/L (ref 20–29)
CREAT SERPL-MCNC: 0.67 MG/DL (ref 0.57–1)
EGFRCR SERPLBLD CKD-EPI 2021: 99 ML/MIN/1.73
EOSINOPHIL # BLD AUTO: 0.2 X10E3/UL (ref 0–0.4)
EOSINOPHIL NFR BLD AUTO: 2 %
ERYTHROCYTE [DISTWIDTH] IN BLOOD BY AUTOMATED COUNT: 13.2 % (ref 11.7–15.4)
GLOBULIN SER CALC-MCNC: 2.6 G/DL (ref 1.5–4.5)
GLUCOSE SERPL-MCNC: 161 MG/DL (ref 70–99)
HBA1C MFR BLD: 6.7 % (ref 4.8–5.6)
HCT VFR BLD AUTO: 39.3 % (ref 34–46.6)
HDLC SERPL-MCNC: 57 MG/DL
HGB BLD-MCNC: 13.5 G/DL (ref 11.1–15.9)
IMM GRANULOCYTES # BLD AUTO: 0 X10E3/UL (ref 0–0.1)
IMM GRANULOCYTES NFR BLD AUTO: 1 %
LDLC SERPL CALC-MCNC: 58 MG/DL (ref 0–99)
LYMPHOCYTES # BLD AUTO: 2.7 X10E3/UL (ref 0.7–3.1)
LYMPHOCYTES NFR BLD AUTO: 30 %
MCH RBC QN AUTO: 29.5 PG (ref 26.6–33)
MCHC RBC AUTO-ENTMCNC: 34.4 G/DL (ref 31.5–35.7)
MCV RBC AUTO: 86 FL (ref 79–97)
MONOCYTES # BLD AUTO: 0.6 X10E3/UL (ref 0.1–0.9)
MONOCYTES NFR BLD AUTO: 7 %
NEUTROPHILS # BLD AUTO: 5.2 X10E3/UL (ref 1.4–7)
NEUTROPHILS NFR BLD AUTO: 59 %
PLATELET # BLD AUTO: 301 X10E3/UL (ref 150–450)
POTASSIUM SERPL-SCNC: 4.5 MMOL/L (ref 3.5–5.2)
PROT SERPL-MCNC: 7.3 G/DL (ref 6–8.5)
RBC # BLD AUTO: 4.57 X10E6/UL (ref 3.77–5.28)
SODIUM SERPL-SCNC: 141 MMOL/L (ref 134–144)
TRIGL SERPL-MCNC: 101 MG/DL (ref 0–149)
TSH SERPL DL<=0.005 MIU/L-ACNC: 3.37 UIU/ML (ref 0.45–4.5)
VIT B12 SERPL-MCNC: 552 PG/ML (ref 232–1245)
VLDLC SERPL CALC-MCNC: 19 MG/DL (ref 5–40)
WBC # BLD AUTO: 8.8 X10E3/UL (ref 3.4–10.8)

## 2024-01-17 ENCOUNTER — OFFICE VISIT (OUTPATIENT)
Dept: FAMILY MEDICINE CLINIC | Facility: CLINIC | Age: 64
End: 2024-01-17
Payer: OTHER GOVERNMENT

## 2024-01-17 VITALS
WEIGHT: 134.9 LBS | DIASTOLIC BLOOD PRESSURE: 80 MMHG | HEART RATE: 68 BPM | RESPIRATION RATE: 16 BRPM | HEIGHT: 63 IN | SYSTOLIC BLOOD PRESSURE: 118 MMHG | OXYGEN SATURATION: 98 % | BODY MASS INDEX: 23.9 KG/M2

## 2024-01-17 DIAGNOSIS — F43.23 ADJUSTMENT DISORDER WITH MIXED ANXIETY AND DEPRESSED MOOD: ICD-10-CM

## 2024-01-17 DIAGNOSIS — E03.9 ACQUIRED HYPOTHYROIDISM: ICD-10-CM

## 2024-01-17 DIAGNOSIS — I10 BENIGN ESSENTIAL HYPERTENSION: Primary | ICD-10-CM

## 2024-01-17 DIAGNOSIS — E55.9 VITAMIN D DEFICIENCY: ICD-10-CM

## 2024-01-17 DIAGNOSIS — E78.2 MIXED HYPERLIPIDEMIA: ICD-10-CM

## 2024-01-17 DIAGNOSIS — D72.829 LEUKOCYTOSIS, UNSPECIFIED TYPE: ICD-10-CM

## 2024-01-17 DIAGNOSIS — E11.9 TYPE 2 DIABETES MELLITUS WITHOUT COMPLICATION, WITHOUT LONG-TERM CURRENT USE OF INSULIN: ICD-10-CM

## 2024-01-17 DIAGNOSIS — C50.912 LOBULAR CARCINOMA OF LEFT BREAST: ICD-10-CM

## 2024-01-17 DIAGNOSIS — Z79.899 ENCOUNTER FOR LONG-TERM (CURRENT) USE OF OTHER MEDICATIONS: ICD-10-CM

## 2024-01-17 LAB
EXPIRATION DATE: NORMAL
Lab: NORMAL
POC AMPHETAMINES: NEGATIVE
POC BARBITURATES: NEGATIVE
POC BENZODIAZEPHINES: NEGATIVE
POC COCAINE: NEGATIVE
POC CREATININE URINE: 0
POC METHADONE: NEGATIVE
POC METHAMPHETAMINE SCREEN URINE: NEGATIVE
POC MICROALBUMIN URINE: 0
POC OPIATES: NEGATIVE
POC OXYCODONE: NEGATIVE
POC PHENCYCLIDINE: NEGATIVE
POC PROPOXYPHENE: NEGATIVE
POC THC: NEGATIVE
POC TRICYCLIC ANTIDEPRESSANTS: NEGATIVE

## 2024-01-17 RX ORDER — LORAZEPAM 0.5 MG/1
0.5 TABLET ORAL EVERY 8 HOURS PRN
Qty: 30 TABLET | Refills: 2 | Status: CANCELLED | OUTPATIENT
Start: 2024-01-17

## 2024-01-17 RX ORDER — VALACYCLOVIR HYDROCHLORIDE 500 MG/1
500 TABLET, FILM COATED ORAL 2 TIMES DAILY
COMMUNITY

## 2024-01-17 RX ORDER — SEMAGLUTIDE 1.34 MG/ML
INJECTION, SOLUTION SUBCUTANEOUS
COMMUNITY

## 2024-01-17 RX ORDER — LORAZEPAM 0.5 MG/1
0.5 TABLET ORAL EVERY 8 HOURS PRN
Qty: 30 TABLET | Refills: 2 | Status: SHIPPED | OUTPATIENT
Start: 2024-01-17

## 2024-01-17 NOTE — ASSESSMENT & PLAN NOTE
A1c is 6.7.  This is increased.  We are going to start intermittent fasting.  She will cut her insulin in half.  She may come off it completely.  Recheck in 3 months.

## 2024-01-17 NOTE — PROGRESS NOTES
Patient Name: Sigrid Holder  : 1960   MRN: [id number]     Chief Complaint:    Chief Complaint   Patient presents with    Follow-up       History of Present Illness: Sigrid Holder is a 63 y.o. female who is here today for follow up.  HPI        Review of Systems:   Review of Systems   Constitutional: Negative.    HENT: Negative.     Eyes: Negative.    Respiratory: Negative.     Cardiovascular: Negative.    Gastrointestinal: Negative.    Neurological: Negative.         Past Medical History:   Past Medical History:   Diagnosis Date    Arthritis     Asthma     Bulging of intervertebral disc between L4 and L5     GERD (gastroesophageal reflux disease)     High risk medication use     DRUG THERPAY FINDING    History of transfusion     Hyperlipidemia     Hypertension     Hypothyroidism     IBS (irritable bowel syndrome)     Iron deficiency anemia     Leukocytosis     Low back pain     Malignant neoplasm of female breast     Sleep apnea     Type 2 diabetes mellitus     Vitamin D deficiency        Past Surgical History:   Past Surgical History:   Procedure Laterality Date    CHOLECYSTECTOMY      COLONOSCOPY N/A 2023    Procedure: COLONOSCOPY;  Surgeon: Alexander Delcid MD;  [place] [facility] [place]  Service: Gastroenterology;  Laterality: N/A;  Diverticulosis, Ascending Polyp    HYSTERECTOMY      STEREOTACTIC BRAIN BIOPSY      WRIST SURGERY Right     [facility] [place]       Family History:   Family History   Problem Relation Age of Onset    Breast cancer Mother     Stroke Father     Heart disease Father     Pancreatic cancer Sister        Social History:   Social History     Socioeconomic History    Marital status:    Tobacco Use    Smoking status: Former     Packs/day: 1.00     Years: 7.00     Additional pack years: 0.00     Total pack years: 7.00     Types: Cigarettes     Start date:      Quit date:      Years since quittin.0    Smokeless tobacco: Never    Vaping Use    Vaping Use: Never used   Substance and Sexual Activity    Alcohol use: Not Currently     Comment: DO NOT DRINK ANYMORE    Drug use: Never    Sexual activity: Yes     Partners: Male       Medications:     Current Outpatient Medications:     ADVAIR DISKUS 250-50 MCG/DOSE DISKUS, , Disp: , Rfl:     anastrozole (ARIMIDEX) 1 MG tablet, Take 1 tablet by mouth Daily., Disp: 90 tablet, Rfl: 3    ascorbic acid (VITAMIN C) 1000 MG tablet, , Disp: , Rfl:     Aspirin Buf,CaCarb-MgCarb-MgO, 81 MG tablet, Take  by mouth., Disp: , Rfl:     calcium carb-cholecalciferol 600-800 MG-UNIT tablet, Take  by mouth., Disp: , Rfl:     cetirizine (zyrTEC) 10 MG tablet, , Disp: , Rfl:     desloratadine (CLARINEX) 5 MG tablet, , Disp: , Rfl:     dexlansoprazole (DEXILANT) 60 MG capsule, Take  by mouth Daily., Disp: , Rfl:     dicyclomine (BENTYL) 10 MG capsule, Take  by mouth 2 (Two) Times a Day., Disp: , Rfl:     Docusate Sodium 100 MG capsule, Take  by mouth Daily., Disp: , Rfl:     Dulaglutide 1.5 MG/0.5ML solution pen-injector, Inject  under the skin into the appropriate area as directed 1 (One) Time Per Week., Disp: , Rfl:     empagliflozin (JARDIANCE) 25 MG tablet tablet, Take  by mouth., Disp: , Rfl:     exenatide er (BYDUREON) 2 MG pen-injector injection, Inject  under the skin into the appropriate area as directed., Disp: , Rfl:     fluconazole (DIFLUCAN) 150 MG tablet, , Disp: , Rfl:     gabapentin (NEURONTIN) 300 MG capsule, , Disp: , Rfl:     icosapent ethyl (VASCEPA) 1 g capsule capsule, TAKE 2 CAPSULES TWICE A DAY WITH FOOD SWALLOWING WHOLE. DO NOT CHEW, OPEN, DISSOLVE AND / OR CRUSH, Disp: 360 capsule, Rfl: 3    INSULIN GLARGINE-YFGN SC, Inject 18 Units under the skin into the appropriate area as directed Every Night., Disp: , Rfl:     levothyroxine (SYNTHROID, LEVOTHROID) 88 MCG tablet, Take 1 tablet by mouth Daily., Disp: , Rfl:     LORazepam (Ativan) 0.5 MG tablet, Take 1 tablet by mouth Every 8 (Eight) Hours  "As Needed for Anxiety., Disp: 30 tablet, Rfl: 2    metFORMIN (GLUCOPHAGE) 850 MG tablet, Take 1 tablet by mouth Every 12 (Twelve) Hours., Disp: , Rfl:     montelukast (SINGULAIR) 10 MG tablet, , Disp: , Rfl:     Multiple Vitamins-Minerals (CENTRUM SILVER 50+WOMEN PO), Take  by mouth Daily., Disp: , Rfl:     NIFEdipine CC (ADALAT CC) 30 MG 24 hr tablet, , Disp: , Rfl:     pantoprazole (PROTONIX) 40 MG EC tablet, , Disp: , Rfl:     pravastatin (PRAVACHOL) 40 MG tablet, Take  by mouth., Disp: , Rfl:     PROAIR  (90 Base) MCG/ACT inhaler, , Disp: , Rfl:     Semaglutide,0.25 or 0.5MG/DOS, (Ozempic, 0.25 or 0.5 MG/DOSE,) 2 MG/1.5ML solution pen-injector, Ozempic (0.25 or 0.5 MG/DOSE), Disp: , Rfl:     sertraline (Zoloft) 25 MG tablet, Take 1 tablet by mouth Daily., Disp: 90 tablet, Rfl: 1    telmisartan (MICARDIS) 40 MG tablet, Take  by mouth Daily., Disp: , Rfl:     valACYclovir (VALTREX) 500 MG tablet, Take 1 tablet by mouth 2 (Two) Times a Day., Disp: , Rfl:     Xylitol 500 MG disk, XyliMelts 500 MG Mouth/Throat Disk; Patient Sig: XyliMelts 500 MG Mouth/Throat Disk 1 IN THE EVENING; 0; 05-Feb-2015; Active, Disp: , Rfl:     ZETONNA 37 MCG/ACT aerosol solution, , Disp: , Rfl:     Current Facility-Administered Medications:     BREAST PROSTHESIS misc 1 Bag., 1 Bag., Does not apply, Daily, Jessica Bernard APRN    BREAST PROSTHESIS misc 2 Units, 2 Units, Does not apply, PRN, Jessica Bernard APRN    Allergies:   Allergies   Allergen Reactions    Liraglutide Nausea And Vomiting     Pt not sure if shes allergic to this or not.         Physical Exam:  Vital Signs:   Vitals:    01/17/24 0824   BP: 118/80   BP Location: Left arm   Patient Position: Sitting   Cuff Size: Adult   Pulse: 68   Resp: 16   SpO2: 98%   Weight: 61.2 kg (134 lb 14.4 oz)   Height: 160 cm (62.99\")     Body mass index is 23.9 kg/m².     Physical Exam  Vitals and nursing note reviewed.   Constitutional:       Appearance: Normal appearance. She is normal " weight.   HENT:      Head: Normocephalic and atraumatic.      Right Ear: Tympanic membrane, ear canal and external ear normal.      Left Ear: Tympanic membrane, ear canal and external ear normal.      Nose: Nose normal.      Mouth/Throat:      Mouth: Mucous membranes are dry.      Pharynx: Oropharynx is clear.   Eyes:      Extraocular Movements: Extraocular movements intact.      Conjunctiva/sclera: Conjunctivae normal.      Pupils: Pupils are equal, round, and reactive to light.   Cardiovascular:      Rate and Rhythm: Normal rate and regular rhythm.      Pulses: Normal pulses.      Heart sounds: Normal heart sounds.   Pulmonary:      Effort: Pulmonary effort is normal.      Breath sounds: Normal breath sounds.   Musculoskeletal:      Cervical back: Normal range of motion and neck supple.   Feet:      Comments: Patient had normal pulses in dorsalis pedis and posterior tibial bilaterally.  Patient had no abnormal callus or ulcer formation bilaterally.  Patient had good sharp and dull discrimination throughout all areas of the foot.  Sensation was intact.  Patient had normal diabetic foot exam.  Discussed proper foot wear and counseling of feet hygiene.       Neurological:      Mental Status: She is alert.         Procedures      Assessment/Plan:   Diagnoses and all orders for this visit:    1. Benign essential hypertension (Primary)  Assessment & Plan:  Discussed with patient to monitor their blood pressure and if systolic blood pressure goes above 140 or diastolic is above 90 to return to clinic.  Take medicines as directed, call for any problems, patient not having or any worrisome symptoms.        Orders:  -     Hemoglobin A1c; Future  -     Lipid Panel; Future  -     Comprehensive Metabolic Panel; Future  -     Vitamin B12; Future  -     Vitamin D,25-Hydroxy; Future  -     TSH Rfx On Abnormal To Free T4; Future  -     CBC & Differential; Future    2. Mixed hyperlipidemia  Assessment & Plan:  HDL 57.  LDL 58.   Triglycerides 101.    Orders:  -     Hemoglobin A1c; Future  -     Lipid Panel; Future  -     Comprehensive Metabolic Panel; Future  -     Vitamin B12; Future  -     Vitamin D,25-Hydroxy; Future  -     TSH Rfx On Abnormal To Free T4; Future  -     CBC & Differential; Future    3. Acquired hypothyroidism  Assessment & Plan:  TSH is 3.370.  Recheck in 6 months.    Orders:  -     Hemoglobin A1c; Future  -     Lipid Panel; Future  -     Comprehensive Metabolic Panel; Future  -     Vitamin B12; Future  -     Vitamin D,25-Hydroxy; Future  -     TSH Rfx On Abnormal To Free T4; Future  -     CBC & Differential; Future    4. Type 2 diabetes mellitus without complication, without long-term current use of insulin  Assessment & Plan:  A1c is 6.7.  This is increased.  We are going to start intermittent fasting.  She will cut her insulin in half.  She may come off it completely.  Recheck in 3 months.    Orders:  -     POCT microalbumin  -     Hemoglobin A1c; Future  -     Lipid Panel; Future  -     Comprehensive Metabolic Panel; Future  -     Vitamin B12; Future  -     Vitamin D,25-Hydroxy; Future  -     TSH Rfx On Abnormal To Free T4; Future  -     CBC & Differential; Future    5. Vitamin D deficiency  Assessment & Plan:  MND is 52.7.  We will follow-up.    Orders:  -     Hemoglobin A1c; Future  -     Lipid Panel; Future  -     Comprehensive Metabolic Panel; Future  -     Vitamin B12; Future  -     Vitamin D,25-Hydroxy; Future  -     TSH Rfx On Abnormal To Free T4; Future  -     CBC & Differential; Future    6. Lobular carcinoma of left breast  Assessment & Plan:  Patient is BRCA2 positive.  Follow up with oncologist.    Orders:  -     Hemoglobin A1c; Future  -     Lipid Panel; Future  -     Comprehensive Metabolic Panel; Future  -     Vitamin B12; Future  -     Vitamin D,25-Hydroxy; Future  -     TSH Rfx On Abnormal To Free T4; Future  -     CBC & Differential; Future    7. Leukocytosis, unspecified type  Assessment &  Plan:  White count is 8.8.  We will follow-up.    Orders:  -     Hemoglobin A1c; Future  -     Lipid Panel; Future  -     Comprehensive Metabolic Panel; Future  -     Vitamin B12; Future  -     Vitamin D,25-Hydroxy; Future  -     TSH Rfx On Abnormal To Free T4; Future  -     CBC & Differential; Future    8. Adjustment disorder with mixed anxiety and depressed mood  -     LORazepam (Ativan) 0.5 MG tablet; Take 1 tablet by mouth Every 8 (Eight) Hours As Needed for Anxiety.  Dispense: 30 tablet; Refill: 2  -     Hemoglobin A1c; Future  -     Lipid Panel; Future  -     Comprehensive Metabolic Panel; Future  -     Vitamin B12; Future  -     Vitamin D,25-Hydroxy; Future  -     TSH Rfx On Abnormal To Free T4; Future  -     CBC & Differential; Future    9. Encounter for long-term (current) use of other medications  -     POC Urine Drug Screen, Triage  -     Hemoglobin A1c; Future  -     Lipid Panel; Future  -     Comprehensive Metabolic Panel; Future  -     Vitamin B12; Future  -     Vitamin D,25-Hydroxy; Future  -     TSH Rfx On Abnormal To Free T4; Future  -     CBC & Differential; Future             Follow Up:   Return in about 3 months (around 4/17/2024) for Annual physical, Bloodwork 1 week prior to next appointment.    I spent 15 total minutes, face-to-face, caring for Sigrid today.  of this time involved counseling and/or coordination of care as documented within this note.       Lopez Partida MD  [facility]     Answers submitted by the patient for this visit:  Other (Submitted on 1/10/2024)  Please describe your symptoms.: 6 month check up.  Have you had these symptoms before?: No  How long have you been having these symptoms?: 1-4 days  Primary Reason for Visit (Submitted on 1/10/2024)  What is the primary reason for your visit?: Other

## 2024-03-07 ENCOUNTER — TELEPHONE (OUTPATIENT)
Dept: FAMILY MEDICINE CLINIC | Facility: CLINIC | Age: 64
End: 2024-03-07
Payer: OTHER GOVERNMENT

## 2024-03-07 DIAGNOSIS — T78.40XA ALLERGY, INITIAL ENCOUNTER: Primary | ICD-10-CM

## 2024-05-29 ENCOUNTER — LAB (OUTPATIENT)
Dept: FAMILY MEDICINE CLINIC | Facility: CLINIC | Age: 64
End: 2024-05-29
Payer: OTHER GOVERNMENT

## 2024-05-29 DIAGNOSIS — D72.829 LEUKOCYTOSIS, UNSPECIFIED TYPE: ICD-10-CM

## 2024-05-29 DIAGNOSIS — Z79.899 ENCOUNTER FOR LONG-TERM (CURRENT) USE OF OTHER MEDICATIONS: ICD-10-CM

## 2024-05-29 DIAGNOSIS — E11.9 TYPE 2 DIABETES MELLITUS WITHOUT COMPLICATION, WITHOUT LONG-TERM CURRENT USE OF INSULIN: ICD-10-CM

## 2024-05-29 DIAGNOSIS — E78.2 MIXED HYPERLIPIDEMIA: ICD-10-CM

## 2024-05-29 DIAGNOSIS — E03.9 ACQUIRED HYPOTHYROIDISM: ICD-10-CM

## 2024-05-29 DIAGNOSIS — F43.23 ADJUSTMENT DISORDER WITH MIXED ANXIETY AND DEPRESSED MOOD: ICD-10-CM

## 2024-05-29 DIAGNOSIS — I10 BENIGN ESSENTIAL HYPERTENSION: ICD-10-CM

## 2024-05-29 DIAGNOSIS — E55.9 VITAMIN D DEFICIENCY: ICD-10-CM

## 2024-05-29 DIAGNOSIS — C50.912 LOBULAR CARCINOMA OF LEFT BREAST: ICD-10-CM

## 2024-05-29 PROCEDURE — 36415 COLL VENOUS BLD VENIPUNCTURE: CPT | Performed by: FAMILY MEDICINE

## 2024-05-30 LAB
25(OH)D3+25(OH)D2 SERPL-MCNC: NORMAL NG/ML
ALBUMIN SERPL-MCNC: 4.5 G/DL (ref 3.9–4.9)
ALBUMIN/GLOB SERPL: 1.8 {RATIO} (ref 1.2–2.2)
ALP SERPL-CCNC: 130 IU/L (ref 44–121)
ALT SERPL-CCNC: 24 IU/L (ref 0–32)
AST SERPL-CCNC: 22 IU/L (ref 0–40)
BASOPHILS # BLD AUTO: 0 X10E3/UL (ref 0–0.2)
BASOPHILS NFR BLD AUTO: 0 %
BILIRUB SERPL-MCNC: 0.3 MG/DL (ref 0–1.2)
BUN SERPL-MCNC: 18 MG/DL (ref 8–27)
BUN/CREAT SERPL: 26 (ref 12–28)
CALCIUM SERPL-MCNC: 9.5 MG/DL (ref 8.7–10.3)
CHLORIDE SERPL-SCNC: 97 MMOL/L (ref 96–106)
CHOLEST SERPL-MCNC: 135 MG/DL (ref 100–199)
CO2 SERPL-SCNC: 23 MMOL/L (ref 20–29)
CREAT SERPL-MCNC: 0.69 MG/DL (ref 0.57–1)
EGFRCR SERPLBLD CKD-EPI 2021: 98 ML/MIN/1.73
EOSINOPHIL # BLD AUTO: 0.1 X10E3/UL (ref 0–0.4)
EOSINOPHIL NFR BLD AUTO: 1 %
ERYTHROCYTE [DISTWIDTH] IN BLOOD BY AUTOMATED COUNT: 13.2 % (ref 11.7–15.4)
GLOBULIN SER CALC-MCNC: 2.5 G/DL (ref 1.5–4.5)
GLUCOSE SERPL-MCNC: 129 MG/DL (ref 70–99)
HBA1C MFR BLD: 6.6 % (ref 4.8–5.6)
HCT VFR BLD AUTO: 41.3 % (ref 34–46.6)
HDLC SERPL-MCNC: 48 MG/DL
HGB BLD-MCNC: 13.1 G/DL (ref 11.1–15.9)
IMM GRANULOCYTES # BLD AUTO: 0 X10E3/UL (ref 0–0.1)
IMM GRANULOCYTES NFR BLD AUTO: 0 %
LDLC SERPL CALC-MCNC: 59 MG/DL (ref 0–99)
LYMPHOCYTES # BLD AUTO: 2.6 X10E3/UL (ref 0.7–3.1)
LYMPHOCYTES NFR BLD AUTO: 26 %
MCH RBC QN AUTO: 28.4 PG (ref 26.6–33)
MCHC RBC AUTO-ENTMCNC: 31.7 G/DL (ref 31.5–35.7)
MCV RBC AUTO: 90 FL (ref 79–97)
MONOCYTES # BLD AUTO: 0.8 X10E3/UL (ref 0.1–0.9)
MONOCYTES NFR BLD AUTO: 8 %
NEUTROPHILS # BLD AUTO: 6.4 X10E3/UL (ref 1.4–7)
NEUTROPHILS NFR BLD AUTO: 65 %
PLATELET # BLD AUTO: 303 X10E3/UL (ref 150–450)
POTASSIUM SERPL-SCNC: 4.3 MMOL/L (ref 3.5–5.2)
PROT SERPL-MCNC: 7 G/DL (ref 6–8.5)
RBC # BLD AUTO: 4.61 X10E6/UL (ref 3.77–5.28)
SODIUM SERPL-SCNC: 138 MMOL/L (ref 134–144)
SPECIMEN STATUS: NORMAL
TRIGL SERPL-MCNC: 166 MG/DL (ref 0–149)
TSH SERPL DL<=0.005 MIU/L-ACNC: 3.12 UIU/ML (ref 0.45–4.5)
VIT B12 SERPL-MCNC: 435 PG/ML (ref 232–1245)
VLDLC SERPL CALC-MCNC: 28 MG/DL (ref 5–40)
WBC # BLD AUTO: 10 X10E3/UL (ref 3.4–10.8)

## 2024-05-31 LAB — 25(OH)D3+25(OH)D2 SERPL-MCNC: 50.7 NG/ML (ref 30–100)

## 2024-06-05 ENCOUNTER — OFFICE VISIT (OUTPATIENT)
Dept: FAMILY MEDICINE CLINIC | Facility: CLINIC | Age: 64
End: 2024-06-05
Payer: OTHER GOVERNMENT

## 2024-06-05 VITALS
WEIGHT: 135.2 LBS | SYSTOLIC BLOOD PRESSURE: 122 MMHG | BODY MASS INDEX: 23.96 KG/M2 | RESPIRATION RATE: 16 BRPM | OXYGEN SATURATION: 98 % | HEIGHT: 63 IN | DIASTOLIC BLOOD PRESSURE: 68 MMHG | HEART RATE: 67 BPM

## 2024-06-05 DIAGNOSIS — D72.829 LEUKOCYTOSIS, UNSPECIFIED TYPE: ICD-10-CM

## 2024-06-05 DIAGNOSIS — F43.23 ADJUSTMENT DISORDER WITH MIXED ANXIETY AND DEPRESSED MOOD: ICD-10-CM

## 2024-06-05 DIAGNOSIS — E03.9 ACQUIRED HYPOTHYROIDISM: ICD-10-CM

## 2024-06-05 DIAGNOSIS — E78.2 MIXED HYPERLIPIDEMIA: ICD-10-CM

## 2024-06-05 DIAGNOSIS — C50.912 LOBULAR CARCINOMA OF LEFT BREAST: ICD-10-CM

## 2024-06-05 DIAGNOSIS — I10 BENIGN ESSENTIAL HYPERTENSION: Primary | ICD-10-CM

## 2024-06-05 DIAGNOSIS — M79.671 RIGHT FOOT PAIN: ICD-10-CM

## 2024-06-05 DIAGNOSIS — E55.9 VITAMIN D DEFICIENCY: ICD-10-CM

## 2024-06-05 DIAGNOSIS — E11.9 TYPE 2 DIABETES MELLITUS WITHOUT COMPLICATION, WITHOUT LONG-TERM CURRENT USE OF INSULIN: ICD-10-CM

## 2024-06-05 PROCEDURE — 99214 OFFICE O/P EST MOD 30 MIN: CPT | Performed by: FAMILY MEDICINE

## 2024-06-05 RX ORDER — LORAZEPAM 0.5 MG/1
0.5 TABLET ORAL EVERY 8 HOURS PRN
Qty: 30 TABLET | Refills: 2 | Status: SHIPPED | OUTPATIENT
Start: 2024-06-05

## 2024-06-05 RX ORDER — FAMOTIDINE 40 MG/1
40 TABLET, FILM COATED ORAL DAILY
COMMUNITY
Start: 2024-01-25

## 2024-06-05 NOTE — PROGRESS NOTES
Patient Name: Sigrid Holder  : 1960   MRN: [id number]     Chief Complaint:    Chief Complaint   Patient presents with    Annual Exam       History of Present Illness: Sigrid Holder is a 63 y.o. female who is here today for follow up on BG and foot pain  HPI        Review of Systems:   Review of Systems   Constitutional: Negative.    HENT: Negative.     Eyes: Negative.    Respiratory: Negative.     Cardiovascular: Negative.    Gastrointestinal: Negative.    Neurological: Negative.         Past Medical History:   Past Medical History:   Diagnosis Date    Arthritis     Asthma     Bulging of intervertebral disc between L4 and L5     GERD (gastroesophageal reflux disease)     High risk medication use     DRUG THERPAY FINDING    History of transfusion     Hyperlipidemia     Hypertension     Hypothyroidism     IBS (irritable bowel syndrome)     Iron deficiency anemia     Leukocytosis     Low back pain     Malignant neoplasm of female breast     Sleep apnea     Type 2 diabetes mellitus     Vitamin D deficiency        Past Surgical History:   Past Surgical History:   Procedure Laterality Date    CHOLECYSTECTOMY      COLONOSCOPY N/A 2023    Procedure: COLONOSCOPY;  Surgeon: Alexander Delcid MD;  [place] [facility] [place]  Service: Gastroenterology;  Laterality: N/A;  Diverticulosis, Ascending Polyp    HYSTERECTOMY      STEREOTACTIC BRAIN BIOPSY      [facility]ST [facility]GERY Right     [facility] [place]       Family History:   Family History   Problem Relation Age of Onset    Breast cancer Mother     Stroke Father     Heart disease Father     Pancreatic cancer Sister        Social History:   Social History     Socioeconomic History    Marital status:    Tobacco Use    Smoking status: Former     Current packs/day: 0.00     Average packs/day: 1 pack/day for 7.0 years (7.0 ttl pk-yrs)     Types: Cigarettes     Start date:      Quit date:      Years since quittin.4    Smokeless  tobacco: Never   Vaping Use    Vaping status: Never Used   Substance and Sexual Activity    Alcohol use: Not Currently     Comment: DO NOT DRINK ANYMORE    Drug use: Never    Sexual activity: Yes     Partners: Male       Medications:     Current Outpatient Medications:     ADVAIR DISKUS 250-50 MCG/DOSE DISKUS, , Disp: , Rfl:     anastrozole (ARIMIDEX) 1 MG tablet, Take 1 tablet by mouth Daily., Disp: 90 tablet, Rfl: 3    ascorbic acid (VITAMIN C) 1000 MG tablet, , Disp: , Rfl:     Aspirin Buf,CaCarb-MgCarb-MgO, 81 MG tablet, Take  by mouth., Disp: , Rfl:     calcium carb-cholecalciferol 600-800 MG-UNIT tablet, Take  by mouth., Disp: , Rfl:     cetirizine (zyrTEC) 10 MG tablet, , Disp: , Rfl:     Continuous Glucose Sensor (FreeStyle Wilbur 2 Sensor) misc, CHECK BLOOD GLUCOSE AS DIRECTED AND REPLACE EVERY 14 DAYS, Disp: , Rfl:     desloratadine (CLARINEX) 5 MG tablet, , Disp: , Rfl:     dexlansoprazole (DEXILANT) 60 MG capsule, Take  by mouth Daily., Disp: , Rfl:     dicyclomine (BENTYL) 10 MG capsule, Take  by mouth 2 (Two) Times a Day., Disp: , Rfl:     Docusate Sodium 100 MG capsule, Take  by mouth Daily., Disp: , Rfl:     Dulaglutide 1.5 MG/0.5ML solution pen-injector, Inject  under the skin into the appropriate area as directed 1 (One) Time Per Week., Disp: , Rfl:     empagliflozin (JARDIANCE) 25 MG tablet tablet, Take  by mouth., Disp: , Rfl:     exenatide er (BYDUREON) 2 MG pen-injector injection, Inject  under the skin into the appropriate area as directed., Disp: , Rfl:     famotidine (PEPCID) 40 MG tablet, Take 1 tablet by mouth Daily., Disp: , Rfl:     fluconazole (DIFLUCAN) 150 MG tablet, , Disp: , Rfl:     gabapentin (NEURONTIN) 300 MG capsule, , Disp: , Rfl:     icosapent ethyl (VASCEPA) 1 g capsule capsule, TAKE 2 CAPSULES TWICE A DAY WITH FOOD SWALLOWING WHOLE. DO NOT CHEW, OPEN, DISSOLVE AND / OR CRUSH, Disp: 360 capsule, Rfl: 3    INSULIN GLARGINE-YFGN SC, Inject 18 Units under the skin into the  appropriate area as directed Every Night., Disp: , Rfl:     levothyroxine (SYNTHROID, LEVOTHROID) 88 MCG tablet, Take 1 tablet by mouth Daily., Disp: , Rfl:     LORazepam (Ativan) 0.5 MG tablet, Take 1 tablet by mouth Every 8 (Eight) Hours As Needed for Anxiety., Disp: 30 tablet, Rfl: 2    metFORMIN (GLUCOPHAGE) 850 MG tablet, Take 1 tablet by mouth Every 12 (Twelve) Hours., Disp: , Rfl:     montelukast (SINGULAIR) 10 MG tablet, , Disp: , Rfl:     Multiple Vitamins-Minerals (CENTRUM SILVER 50+WOMEN PO), Take  by mouth Daily., Disp: , Rfl:     NIFEdipine CC (ADALAT CC) 30 MG 24 hr tablet, , Disp: , Rfl:     pantoprazole (PROTONIX) 40 MG EC tablet, , Disp: , Rfl:     pravastatin (PRAVACHOL) 40 MG tablet, Take  by mouth., Disp: , Rfl:     PROAIR  (90 Base) MCG/ACT inhaler, , Disp: , Rfl:     Semaglutide,0.25 or 0.5MG/DOS, (Ozempic, 0.25 or 0.5 MG/DOSE,) 2 MG/1.5ML solution pen-injector, Ozempic (0.25 or 0.5 MG/DOSE), Disp: , Rfl:     sertraline (Zoloft) 25 MG tablet, Take 1 tablet by mouth Daily., Disp: 90 tablet, Rfl: 1    telmisartan (MICARDIS) 40 MG tablet, Take  by mouth Daily., Disp: , Rfl:     valACYclovir (VALTREX) 500 MG tablet, Take 1 tablet by mouth 2 (Two) Times a Day., Disp: , Rfl:     Xylitol 500 MG disk, XyliMelts 500 MG Mouth/Throat Disk; Patient Sig: XyliMelts 500 MG Mouth/Throat Disk 1 IN THE EVENING; 0; 05-Feb-2015; Active, Disp: , Rfl:     ZETONNA 37 MCG/ACT aerosol solution, , Disp: , Rfl:     Current Facility-Administered Medications:     BREAST PROSTHESIS misc 1 Bag., 1 Bag., Does not apply, Daily, Jessica Bernard, APRN    BREAST PROSTHESIS misc 2 Units, 2 Units, Does not apply, PRN, Jessica Bernard, APRN    Allergies:   Allergies   Allergen Reactions    Liraglutide Nausea And Vomiting     Pt not sure if shes allergic to this or not.         Physical Exam:  Vital Signs:   Vitals:    06/05/24 1006   BP: 122/68   BP Location: Left arm   Patient Position: Sitting   Cuff Size: Adult   Pulse: 67  "  Resp: 16   SpO2: 98%   Weight: 61.3 kg (135 lb 3.2 oz)   Height: 160 cm (62.99\")   PainSc: 10-Worst pain ever   PainLoc: Foot     Body mass index is 23.96 kg/m².     Physical Exam  Vitals and nursing note reviewed.   Constitutional:       Appearance: Normal appearance. She is normal weight.   HENT:      Head: Normocephalic and atraumatic.      Right Ear: Tympanic membrane, ear canal and external ear normal.      Left Ear: Tympanic membrane, ear canal and external ear normal.      Nose: Nose normal.      Mouth/Throat:      Mouth: Mucous membranes are dry.      Pharynx: Oropharynx is clear.   Eyes:      Extraocular Movements: Extraocular movements intact.      Conjunctiva/sclera: Conjunctivae normal.      Pupils: Pupils are equal, round, and reactive to light.   Cardiovascular:      Rate and Rhythm: Normal rate and regular rhythm.      Pulses: Normal pulses.      Heart sounds: Normal heart sounds.   Pulmonary:      Effort: Pulmonary effort is normal.      Breath sounds: Normal breath sounds.   Musculoskeletal:         General: Normal range of motion.      Cervical back: Normal range of motion and neck supple.   Feet:      Comments: Patient had normal pulses in dorsalis pedis and posterior tibial bilaterally.  Patient had no abnormal callus or ulcer formation bilaterally.  Patient had good sharp and dull discrimination throughout all areas of the foot.  Sensation was intact.  Patient had normal diabetic foot exam.  Discussed proper foot wear and counseling of feet hygiene.       Neurological:      Mental Status: She is alert.         Procedures      Assessment/Plan:   Diagnoses and all orders for this visit:    1. Benign essential hypertension (Primary)  Assessment & Plan:  Discussed with patient to monitor their blood pressure and if systolic blood pressure goes above 140 or diastolic is above 90 to return to clinic.  Take medicines as directed, call for any problems, patient not having or any worrisome symptoms.  "         2. Adjustment disorder with mixed anxiety and depressed mood  Assessment & Plan:  Continue current medications.      Orders:  -     LORazepam (Ativan) 0.5 MG tablet; Take 1 tablet by mouth Every 8 (Eight) Hours As Needed for Anxiety.  Dispense: 30 tablet; Refill: 2    3. Mixed hyperlipidemia  Assessment & Plan:  HDL 48.  LDL 59.  Triglycerides 166.      4. Acquired hypothyroidism  Assessment & Plan:  TSH is 3.120.  Will follow.      5. Type 2 diabetes mellitus without complication, without long-term current use of insulin  Assessment & Plan:  A1c is 6.6.  We will follow-up.      6. Vitamin D deficiency  Assessment & Plan:  Vitamin D is 50.7.  We will follow-up.      7. Lobular carcinoma of left breast  Assessment & Plan:  Follow-up with oncology.      8. Leukocytosis, unspecified type  Assessment & Plan:  White count 10.0.  Will follow.      9. Right foot pain  Assessment & Plan:  Patient had injured her right foot.  It has full range of motion and no sign of trauma.  It is little tender on the lateral side.  She states it is gotten better.  Will give another 2 weeks and it did not improve I will x-ray it.               Follow Up:   Return in about 3 months (around 9/5/2024) for Recheck.      Lopez Partida MD  [facility]     Answers submitted by the patient for this visit:  Other (Submitted on 5/29/2024)  Please describe your symptoms.: Routine appointment  Have you had these symptoms before?: No  How long have you been having these symptoms?: 1-4 days  Primary Reason for Visit (Submitted on 5/29/2024)  What is the primary reason for your visit?: Other

## 2024-06-05 NOTE — ASSESSMENT & PLAN NOTE
Patient had injured her right foot.  It has full range of motion and no sign of trauma.  It is little tender on the lateral side.  She states it is gotten better.  Will give another 2 weeks and it did not improve I will x-ray it.

## 2024-08-26 ENCOUNTER — TELEPHONE (OUTPATIENT)
Dept: FAMILY MEDICINE CLINIC | Facility: CLINIC | Age: 64
End: 2024-08-26

## 2024-08-26 DIAGNOSIS — E55.9 VITAMIN D DEFICIENCY: ICD-10-CM

## 2024-08-26 DIAGNOSIS — E03.9 ACQUIRED HYPOTHYROIDISM: ICD-10-CM

## 2024-08-26 DIAGNOSIS — E11.9 TYPE 2 DIABETES MELLITUS WITHOUT COMPLICATION, WITHOUT LONG-TERM CURRENT USE OF INSULIN: ICD-10-CM

## 2024-08-26 DIAGNOSIS — R79.9 ABNORMAL BLOOD CHEMISTRY LEVEL: Primary | ICD-10-CM

## 2024-08-26 DIAGNOSIS — I10 BENIGN ESSENTIAL HYPERTENSION: ICD-10-CM

## 2024-08-28 ENCOUNTER — LAB (OUTPATIENT)
Dept: FAMILY MEDICINE CLINIC | Facility: CLINIC | Age: 64
End: 2024-08-28
Payer: OTHER GOVERNMENT

## 2024-08-28 DIAGNOSIS — E55.9 VITAMIN D DEFICIENCY: ICD-10-CM

## 2024-08-28 DIAGNOSIS — E11.9 TYPE 2 DIABETES MELLITUS WITHOUT COMPLICATION, WITHOUT LONG-TERM CURRENT USE OF INSULIN: ICD-10-CM

## 2024-08-28 DIAGNOSIS — E03.9 ACQUIRED HYPOTHYROIDISM: ICD-10-CM

## 2024-08-28 DIAGNOSIS — I10 BENIGN ESSENTIAL HYPERTENSION: ICD-10-CM

## 2024-08-28 PROCEDURE — 36415 COLL VENOUS BLD VENIPUNCTURE: CPT | Performed by: FAMILY MEDICINE

## 2024-08-29 LAB
25(OH)D3+25(OH)D2 SERPL-MCNC: 55.4 NG/ML (ref 30–100)
ALBUMIN SERPL-MCNC: 4.6 G/DL (ref 3.9–4.9)
ALP SERPL-CCNC: 139 IU/L (ref 44–121)
ALT SERPL-CCNC: 24 IU/L (ref 0–32)
AST SERPL-CCNC: 20 IU/L (ref 0–40)
BASOPHILS # BLD AUTO: 0.1 X10E3/UL (ref 0–0.2)
BASOPHILS NFR BLD AUTO: 1 %
BILIRUB SERPL-MCNC: 0.3 MG/DL (ref 0–1.2)
BUN SERPL-MCNC: 16 MG/DL (ref 8–27)
BUN/CREAT SERPL: 27 (ref 12–28)
CALCIUM SERPL-MCNC: 9.3 MG/DL (ref 8.7–10.3)
CHLORIDE SERPL-SCNC: 97 MMOL/L (ref 96–106)
CHOLEST SERPL-MCNC: 152 MG/DL (ref 100–199)
CK SERPL-CCNC: 69 U/L (ref 32–182)
CO2 SERPL-SCNC: 25 MMOL/L (ref 20–29)
CREAT SERPL-MCNC: 0.6 MG/DL (ref 0.57–1)
EGFRCR SERPLBLD CKD-EPI 2021: 101 ML/MIN/1.73
EOSINOPHIL # BLD AUTO: 0.3 X10E3/UL (ref 0–0.4)
EOSINOPHIL NFR BLD AUTO: 3 %
ERYTHROCYTE [DISTWIDTH] IN BLOOD BY AUTOMATED COUNT: 13.9 % (ref 11.7–15.4)
GLOBULIN SER CALC-MCNC: 2.6 G/DL (ref 1.5–4.5)
GLUCOSE SERPL-MCNC: 109 MG/DL (ref 70–99)
HBA1C MFR BLD: 6.7 % (ref 4.8–5.6)
HCT VFR BLD AUTO: 41.7 % (ref 34–46.6)
HDLC SERPL-MCNC: 51 MG/DL
HGB BLD-MCNC: 13.2 G/DL (ref 11.1–15.9)
IMM GRANULOCYTES # BLD AUTO: 0 X10E3/UL (ref 0–0.1)
IMM GRANULOCYTES NFR BLD AUTO: 0 %
LDLC SERPL CALC-MCNC: 78 MG/DL (ref 0–99)
LYMPHOCYTES # BLD AUTO: 2.7 X10E3/UL (ref 0.7–3.1)
LYMPHOCYTES NFR BLD AUTO: 26 %
MCH RBC QN AUTO: 28.3 PG (ref 26.6–33)
MCHC RBC AUTO-ENTMCNC: 31.7 G/DL (ref 31.5–35.7)
MCV RBC AUTO: 90 FL (ref 79–97)
MONOCYTES # BLD AUTO: 0.8 X10E3/UL (ref 0.1–0.9)
MONOCYTES NFR BLD AUTO: 8 %
NEUTROPHILS # BLD AUTO: 6.5 X10E3/UL (ref 1.4–7)
NEUTROPHILS NFR BLD AUTO: 62 %
PLATELET # BLD AUTO: 305 X10E3/UL (ref 150–450)
POTASSIUM SERPL-SCNC: 3.9 MMOL/L (ref 3.5–5.2)
PROT SERPL-MCNC: 7.2 G/DL (ref 6–8.5)
RBC # BLD AUTO: 4.66 X10E6/UL (ref 3.77–5.28)
SODIUM SERPL-SCNC: 136 MMOL/L (ref 134–144)
TRIGL SERPL-MCNC: 132 MG/DL (ref 0–149)
TSH SERPL DL<=0.005 MIU/L-ACNC: 2.18 UIU/ML (ref 0.45–4.5)
VLDLC SERPL CALC-MCNC: 23 MG/DL (ref 5–40)
WBC # BLD AUTO: 10.3 X10E3/UL (ref 3.4–10.8)

## 2024-09-05 ENCOUNTER — OFFICE VISIT (OUTPATIENT)
Dept: FAMILY MEDICINE CLINIC | Facility: CLINIC | Age: 64
End: 2024-09-05
Payer: OTHER GOVERNMENT

## 2024-09-05 VITALS
DIASTOLIC BLOOD PRESSURE: 82 MMHG | WEIGHT: 131.9 LBS | OXYGEN SATURATION: 98 % | RESPIRATION RATE: 16 BRPM | SYSTOLIC BLOOD PRESSURE: 130 MMHG | HEART RATE: 68 BPM | HEIGHT: 63 IN | BODY MASS INDEX: 23.37 KG/M2

## 2024-09-05 DIAGNOSIS — E11.9 TYPE 2 DIABETES MELLITUS WITHOUT COMPLICATION, WITHOUT LONG-TERM CURRENT USE OF INSULIN: ICD-10-CM

## 2024-09-05 DIAGNOSIS — E55.9 VITAMIN D DEFICIENCY: ICD-10-CM

## 2024-09-05 DIAGNOSIS — E03.9 ACQUIRED HYPOTHYROIDISM: ICD-10-CM

## 2024-09-05 DIAGNOSIS — I10 BENIGN ESSENTIAL HYPERTENSION: Primary | ICD-10-CM

## 2024-09-05 DIAGNOSIS — E78.2 MIXED HYPERLIPIDEMIA: ICD-10-CM

## 2024-09-05 DIAGNOSIS — D72.829 LEUKOCYTOSIS, UNSPECIFIED TYPE: ICD-10-CM

## 2024-09-05 PROCEDURE — 99214 OFFICE O/P EST MOD 30 MIN: CPT | Performed by: FAMILY MEDICINE

## 2024-09-05 RX ORDER — INSULIN GLARGINE 100 [IU]/ML
18 INJECTION, SOLUTION SUBCUTANEOUS DAILY
COMMUNITY

## 2024-09-05 RX ORDER — OXYBUTYNIN CHLORIDE 5 MG/1
5 TABLET, EXTENDED RELEASE ORAL DAILY
Qty: 90 TABLET | Refills: 1 | Status: SHIPPED | OUTPATIENT
Start: 2024-09-05

## 2024-09-05 NOTE — PROGRESS NOTES
Patient Name: Sigrid Holder  : 1960   MRN: [id number]     Chief Complaint:    Chief Complaint   Patient presents with    Follow-up       History of Present Illness: Sigrid Holder is a 63 y.o. female who is here today for follow up on BG and BP  Follow-up          Review of Systems:   Review of Systems   Constitutional: Negative.    HENT: Negative.     Eyes: Negative.    Respiratory: Negative.     Cardiovascular: Negative.    Gastrointestinal: Negative.    Neurological: Negative.         Past Medical History:   Past Medical History:   Diagnosis Date    Arthritis     Asthma     Bulging of intervertebral disc between L4 and L5     GERD (gastroesophageal reflux disease)     High risk medication use     DRUG THERPAY FINDING    History of transfusion     Hyperlipidemia     Hypertension     Hypothyroidism     IBS (irritable bowel syndrome)     Iron deficiency anemia     Leukocytosis     Low back pain     Malignant neoplasm of female breast     Sleep apnea     Type 2 diabetes mellitus     Vitamin D deficiency        Past Surgical History:   Past Surgical History:   Procedure Laterality Date    CHOLECYSTECTOMY      COLONOSCOPY N/A 2023    Procedure: COLONOSCOPY;  Surgeon: Alexander Delcid MD;  [place]: [facility] [place];  Service: Gastroenterology;  Laterality: N/A;  Diverticulosis, Ascending Polyp    HYSTERECTOMY      STEREOTACTIC BRAIN BIOPSY      WRIST SURGERY Right     [facility] [place]       Family History:   Family History   Problem Relation Age of Onset    Breast cancer Mother     Stroke Father     Heart disease Father     Pancreatic cancer Sister        Social History:   Social History     Socioeconomic History    Marital status:    Tobacco Use    Smoking status: Former     Current packs/day: 0.00     Average packs/day: 1 pack/day for 7.0 years (7.0 ttl pk-yrs)     Types: Cigarettes     Start date:      Quit date:      Years since quittin.7    Smokeless  tobacco: Never   Vaping Use    Vaping status: Never Used   Substance and Sexual Activity    Alcohol use: Not Currently     Comment: DO NOT DRINK ANYMORE    Drug use: Never    Sexual activity: Yes     Partners: Male       Medications:     Current Outpatient Medications:     ADVAIR DISKUS 250-50 MCG/DOSE DISKUS, , Disp: , Rfl:     anastrozole (ARIMIDEX) 1 MG tablet, Take 1 tablet by mouth Daily., Disp: 90 tablet, Rfl: 3    ascorbic acid (VITAMIN C) 1000 MG tablet, , Disp: , Rfl:     Aspirin Buf,CaCarb-MgCarb-MgO, 81 MG tablet, Take  by mouth., Disp: , Rfl:     calcium carb-cholecalciferol 600-800 MG-UNIT tablet, Take  by mouth., Disp: , Rfl:     cetirizine (zyrTEC) 10 MG tablet, , Disp: , Rfl:     Continuous Glucose Sensor (FreeStyle Wilbur 2 Sensor) misc, CHECK BLOOD GLUCOSE AS DIRECTED AND REPLACE EVERY 14 DAYS, Disp: , Rfl:     desloratadine (CLARINEX) 5 MG tablet, , Disp: , Rfl:     dexlansoprazole (DEXILANT) 60 MG capsule, Take  by mouth Daily., Disp: , Rfl:     dicyclomine (BENTYL) 10 MG capsule, Take  by mouth 2 (Two) Times a Day., Disp: , Rfl:     Docusate Sodium 100 MG capsule, Take  by mouth Daily., Disp: , Rfl:     empagliflozin (JARDIANCE) 25 MG tablet tablet, Take  by mouth., Disp: , Rfl:     exenatide er (BYDUREON) 2 MG pen-injector injection, Inject  under the skin into the appropriate area as directed., Disp: , Rfl:     famotidine (PEPCID) 40 MG tablet, Take 1 tablet by mouth Daily., Disp: , Rfl:     gabapentin (NEURONTIN) 300 MG capsule, Take 4 capsules by mouth 3 (Three) Times a Day., Disp: , Rfl:     icosapent ethyl (VASCEPA) 1 g capsule capsule, TAKE 2 CAPSULES TWICE A DAY WITH FOOD SWALLOWING WHOLE. DO NOT CHEW, OPEN, DISSOLVE AND / OR CRUSH, Disp: 360 capsule, Rfl: 3    insulin glargine (LANTUS, SEMGLEE) 100 UNIT/ML injection, Inject 18 Units under the skin into the appropriate area as directed Daily., Disp: , Rfl:     levothyroxine (SYNTHROID, LEVOTHROID) 88 MCG tablet, Take 1 tablet by mouth  Daily., Disp: , Rfl:     LORazepam (Ativan) 0.5 MG tablet, Take 1 tablet by mouth Every 8 (Eight) Hours As Needed for Anxiety., Disp: 30 tablet, Rfl: 2    metFORMIN (GLUCOPHAGE) 850 MG tablet, Take 1 tablet by mouth Every 12 (Twelve) Hours., Disp: , Rfl:     Misc Natural Products (CRAMP RELEAF PO), Take  by mouth., Disp: , Rfl:     montelukast (SINGULAIR) 10 MG tablet, , Disp: , Rfl:     Multiple Vitamins-Minerals (CENTRUM SILVER 50+WOMEN PO), Take  by mouth Daily., Disp: , Rfl:     NIFEdipine CC (ADALAT CC) 30 MG 24 hr tablet, , Disp: , Rfl:     pantoprazole (PROTONIX) 40 MG EC tablet, , Disp: , Rfl:     pravastatin (PRAVACHOL) 40 MG tablet, Take  by mouth., Disp: , Rfl:     PROAIR  (90 Base) MCG/ACT inhaler, , Disp: , Rfl:     Semaglutide,0.25 or 0.5MG/DOS, (Ozempic, 0.25 or 0.5 MG/DOSE,) 2 MG/1.5ML solution pen-injector, Ozempic (0.25 or 0.5 MG/DOSE), Disp: , Rfl:     sertraline (Zoloft) 25 MG tablet, Take 1 tablet by mouth Daily., Disp: 90 tablet, Rfl: 1    telmisartan (MICARDIS) 40 MG tablet, Take  by mouth Daily., Disp: , Rfl:     valACYclovir (VALTREX) 500 MG tablet, Take 1 tablet by mouth 2 (Two) Times a Day., Disp: , Rfl:     Xylitol 500 MG disk, XyliMelts 500 MG Mouth/Throat Disk; Patient Sig: XyliMelts 500 MG Mouth/Throat Disk 1 IN THE EVENING; 0; 05-Feb-2015; Active, Disp: , Rfl:     ZETONNA 37 MCG/ACT aerosol solution, , Disp: , Rfl:     Current Facility-Administered Medications:     BREAST PROSTHESIS misc 1 Bag., 1 Bag., Does not apply, Daily, Jessica Bernard, APRN    BREAST PROSTHESIS misc 2 Units, 2 Units, Does not apply, PRN, Jessica Bernard, APRN    Allergies:   Allergies   Allergen Reactions    Liraglutide Nausea And Vomiting     Pt not sure if shes allergic to this or not.         Physical Exam:  Vital Signs:   Vitals:    09/05/24 0955   BP: 130/82   BP Location: Left arm   Patient Position: Sitting   Cuff Size: Adult   Pulse: 68   Resp: 16   SpO2: 98%   Weight: 59.8 kg (131 lb 14.4 oz)  "  Height: 160 cm (62.99\")     Body mass index is 23.37 kg/m².     Physical Exam  Vitals and nursing note reviewed.   Constitutional:       Appearance: Normal appearance. She is normal weight.   HENT:      Head: Normocephalic and atraumatic.      Right Ear: Tympanic membrane, ear canal and external ear normal.      Left Ear: Tympanic membrane, ear canal and external ear normal.      Nose: Nose normal.      Mouth/Throat:      Mouth: Mucous membranes are dry.      Pharynx: Oropharynx is clear.   Eyes:      Extraocular Movements: Extraocular movements intact.      Conjunctiva/sclera: Conjunctivae normal.      Pupils: Pupils are equal, round, and reactive to light.   Cardiovascular:      Rate and Rhythm: Normal rate and regular rhythm.      Pulses: Normal pulses.      Heart sounds: Normal heart sounds.   Pulmonary:      Effort: Pulmonary effort is normal.      Breath sounds: Normal breath sounds.   Musculoskeletal:      Cervical back: Normal range of motion and neck supple.   Feet:      Comments:      Neurological:      Mental Status: She is alert.         Procedures      Assessment/Plan:   Diagnoses and all orders for this visit:    1. Benign essential hypertension (Primary)  Assessment & Plan:  Discussed with patient to monitor their blood pressure and if systolic blood pressure goes above 140 or diastolic is above 90 to return to clinic.  Take medicines as directed, call for any problems, patient not having or any worrisome symptoms.        Orders:  -     Hemoglobin A1c; Future  -     Vitamin B12; Future  -     Vitamin D,25-Hydroxy; Future  -     Lipid Panel; Future  -     Comprehensive Metabolic Panel; Future  -     TSH Rfx On Abnormal To Free T4; Future  -     CBC & Differential; Future    2. Mixed hyperlipidemia  Assessment & Plan:  HDL 51.  LDL 78.  Triglycerides 132.  We will follow-up.    Orders:  -     Hemoglobin A1c; Future  -     Vitamin B12; Future  -     Vitamin D,25-Hydroxy; Future  -     Lipid Panel; " Future  -     Comprehensive Metabolic Panel; Future  -     TSH Rfx On Abnormal To Free T4; Future  -     CBC & Differential; Future    3. Acquired hypothyroidism  Assessment & Plan:  TSH is 2.180.  We will follow-up.    Orders:  -     Hemoglobin A1c; Future  -     Vitamin B12; Future  -     Vitamin D,25-Hydroxy; Future  -     Lipid Panel; Future  -     Comprehensive Metabolic Panel; Future  -     TSH Rfx On Abnormal To Free T4; Future  -     CBC & Differential; Future    4. Type 2 diabetes mellitus without complication, without long-term current use of insulin  Assessment & Plan:  A1c 6.7.  Recheck in 6 months.    Orders:  -     Hemoglobin A1c; Future  -     Vitamin B12; Future  -     Vitamin D,25-Hydroxy; Future  -     Lipid Panel; Future  -     Comprehensive Metabolic Panel; Future  -     TSH Rfx On Abnormal To Free T4; Future  -     CBC & Differential; Future    5. Vitamin D deficiency  Assessment & Plan:  Vitamin D is 55.4.  We will follow.    Orders:  -     Hemoglobin A1c; Future  -     Vitamin B12; Future  -     Vitamin D,25-Hydroxy; Future  -     Lipid Panel; Future  -     Comprehensive Metabolic Panel; Future  -     TSH Rfx On Abnormal To Free T4; Future  -     CBC & Differential; Future    6. Leukocytosis, unspecified type  Assessment & Plan:  Right count is 10.3.  Recheck in 6 months.    Orders:  -     Hemoglobin A1c; Future  -     Vitamin B12; Future  -     Vitamin D,25-Hydroxy; Future  -     Lipid Panel; Future  -     Comprehensive Metabolic Panel; Future  -     TSH Rfx On Abnormal To Free T4; Future  -     CBC & Differential; Future             Follow Up:   No follow-ups on file.      Lopez Partida MD  [facility]     Answers submitted by the patient for this visit:  Other (Submitted on 8/30/2024)  Please describe your symptoms.: Routine appointment  Have you had these symptoms before?: No  How long have you been having these symptoms?: Greater than 2 weeks  Please list any  medications you are currently taking for this condition.: See Attached list  Please describe any probable cause for these symptoms. : No symptoms  Primary Reason for Visit (Submitted on 8/30/2024)  What is the primary reason for your visit?: Other

## 2025-01-03 ENCOUNTER — TELEPHONE (OUTPATIENT)
Dept: FAMILY MEDICINE CLINIC | Facility: CLINIC | Age: 65
End: 2025-01-03

## 2025-01-03 NOTE — TELEPHONE ENCOUNTER
PATIENT STATED SHE HAS TO HAVE A REFERRAL TO HER ONCOLOGIST SINCE SHE HAS . SHE IS SCHEDULED WITH [facility] ON 1/15 @10:30AM.

## 2025-01-09 NOTE — TELEPHONE ENCOUNTER
Caller: Sigrid Holder    Relationship: Self    Best call back number: [phone]     What is the medical concern/diagnosis: FOLLOW UP FOR BREAST CANCER    What specialty or service is being requested: ONCOLOGY    What is the provider, practice or medical service name: DR JACK HUBBARD, [facility]    What is the office location: [place]    What is the office phone number: [phone]    Any additional details: PT HAS  AND NEEDS A NEW REFERRAL TO GET APPTS COVERED BY INSURANCE. PT HAS NEXT APPT WITH ONCOLOGY 01-.

## 2025-01-10 ENCOUNTER — TELEPHONE (OUTPATIENT)
Dept: ONCOLOGY | Facility: CLINIC | Age: 65
End: 2025-01-10
Payer: OTHER GOVERNMENT

## 2025-01-10 DIAGNOSIS — C50.919 MALIGNANT NEOPLASM OF FEMALE BREAST, UNSPECIFIED ESTROGEN RECEPTOR STATUS, UNSPECIFIED LATERALITY, UNSPECIFIED SITE OF BREAST: Primary | ICD-10-CM

## 2025-01-10 NOTE — TELEPHONE ENCOUNTER
[facility] received a fax that requires your attention. The document has been indexed to the patient’s chart for your review.      Reason for sending: RECEIVED UPDATED ONCOLOGY AUTH    Documents Description: UPDATED AUTH 01/10/25.> INDEXED IN CHART     Name of Sender: GALE FARRIS    Date Indexed: 01/10/25    Notes (if needed): THANKS!

## 2025-01-15 ENCOUNTER — OFFICE VISIT (OUTPATIENT)
Dept: ONCOLOGY | Facility: CLINIC | Age: 65
End: 2025-01-15
Payer: OTHER GOVERNMENT

## 2025-01-15 VITALS
HEART RATE: 78 BPM | HEIGHT: 63 IN | SYSTOLIC BLOOD PRESSURE: 145 MMHG | DIASTOLIC BLOOD PRESSURE: 81 MMHG | TEMPERATURE: 96.4 F | OXYGEN SATURATION: 97 % | RESPIRATION RATE: 18 BRPM | BODY MASS INDEX: 23.57 KG/M2 | WEIGHT: 133 LBS

## 2025-01-15 DIAGNOSIS — Z17.0 MALIGNANT NEOPLASM OF UPPER-OUTER QUADRANT OF LEFT BREAST IN FEMALE, ESTROGEN RECEPTOR POSITIVE: Primary | ICD-10-CM

## 2025-01-15 DIAGNOSIS — C50.412 MALIGNANT NEOPLASM OF UPPER-OUTER QUADRANT OF LEFT BREAST IN FEMALE, ESTROGEN RECEPTOR POSITIVE: Primary | ICD-10-CM

## 2025-01-15 PROCEDURE — 99213 OFFICE O/P EST LOW 20 MIN: CPT | Performed by: NURSE PRACTITIONER

## 2025-01-15 NOTE — PROGRESS NOTES
CHIEF COMPLAINT: Breast cancer    Problem List:  Oncology/Hematology History Overview Note    Stage IA Lobular carcinoma of left breast (CMS/HCC): History of multiple prior mammographic abnormalities with biopsies benign.  More recent mammogram in July worrisome and biopsy consistent with invasive lobular carcinoma.  Underwent lumpectomy and sentinel node biopsy 8/8/18 with Dr. Forbes.  This showed a 1.2 cm infiltrating lobular carcinoma grade 2 Stover Terry 6 out of 7 with 0 out of 1 sentinel nodes involved and negative margins.  ER 95% 3+ positive W[place] 95% 3+ positive and HER-2/will 0+.  Stage 1a, T1c N0 M0.  I saw for the first time 8/28/18 and ordered Oncotype and the recurrence score was 20 with a 10 year risk of recurrence distantly predicted at 13%.  Will need bilateral mastectomies and at least 5 years of Arimidex.  The only staging to perform would be CBC and CMP with this early stage carcinoma.  With lobular carcinoma and a history of multiple prior mammographic abnormalities which presently are considered benign.      -9/12/2018 genetic testing, BRCA2 gene mutation came back positive.      -10/1/2018 prophylactic bilateral mastectomies with Dr. Forbes     2.  Personal history of having her ovaries removed in her 40's.     -1/22/2021 established care with high risk GI clinic at [facility] in light of her BRCA mutation and first-degree relative (sister) with pancreatic cancer.    -2/22/2023 colonoscopy, 5 mm polyp in the ascending colon removed, pathology tubular adenoma.     Lobular carcinoma of left breast   8/28/2018 Initial Diagnosis         9/7/2018 Imaging    CT abdomen and pelvis showed lumpectomy site and fatty liver but no abnormal masses or adenopathy or metastases.     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      9/14/2018 -  Hormonal Therapy     Arimidex     9/14/2018 -  Other Event    CMP glucose 169, BUN 18, creatinine 0.65, potassium 4.0, AST 49, ALT 72, alkaline phosphatase 179.       9/21/2018 Imaging    Total body bone scan IMPRESSION:  1. Low-level changes typical of DJD of the thoracic spine, AC and SC  joints.  2. Small focus of low-level activity in the left ischium, which may also  be benign. Particularly if the patient has symptoms in this region,  consider dedicated imaging.     10/1/2018 Surgery    Surgery       Procedure:  Bilateral total mastectomies          1/8/2019 Imaging    X-Ray pelvis and hips:  Today's exam shows no significant asymmetry of the trabecular pattern of  the left ischium compared to right, no obvious lytic or blastic change  or periosteal reaction. No fracture or avulsion is seen. Pelvic bones  appear intact. SI joints and hip joints appear symmetric and well  maintained.         IMPRESSION:  Pelvis and hips appear within normal limits.         HISTORY OF PRESENT ILLNESS:  The patient is a 64 y.o. female, here for follow up on management of ER positive breast cancer with BRCA2 positive gene mutation on genetic testing currently on adjuvant therapy with Arimidex.  Sigrid has been doing well since we saw her last with no new concerns.  No new or concerning findings on chest wall exam, no new or concerning bony aches or pains.  She is tolerating Arimidex with no unusual side effects.  [facility]  She states that she had a bone density scan this last year and reports that it was stable.  She tries to stay active, she has a little more difficulty walking as regularly due to peripheral neuropathy.  She states her diabetes has been under good control.  They recently increased her gabapentin and she is hoping this will help with the neuropathy.  She follows with high risk clinic at  in light of her BRCA2 positivity and a sister who had pancreatic cancer.    Past Medical History:   Diagnosis Date  "   Arthritis     Asthma     Bulging of intervertebral disc between L4 and L5     GERD (gastroesophageal reflux disease)     High risk medication use     DRUG THERPAY FINDING    History of transfusion     Hyperlipidemia     Hypertension     Hypothyroidism     IBS (irritable bowel syndrome)     Iron deficiency anemia     Leukocytosis     Low back pain     Malignant neoplasm of female breast     Sleep apnea     Type 2 diabetes mellitus     Vitamin D deficiency      Past Surgical History:   Procedure Laterality Date    CHOLECYSTECTOMY  2011    COLONOSCOPY N/A 2/22/2023    Procedure: COLONOSCOPY;  Surgeon: Alexander Delcid MD;  Location: [facility] MAIN OR;  Service: Gastroenterology;  Laterality: N/A;  Diverticulosis, Ascending Polyp    HYSTERECTOMY      STEREOTACTIC BRAIN BIOPSY      [facility] [place]       Allergies   Allergen Reactions    Liraglutide Nausea And Vomiting     Pt not sure if shes allergic to this or not.       Family History and Social History reviewed and changed as necessary    REVIEW OF SYSTEM:   No new somatic concerns    PHYSICAL EXAM:  Well-developed, well-nourished appearing female in no distress  No cervical, supraclavicular or axillary nodes palpable on exam  Chest wall exam is benign status post bilateral mastectomies, no abnormal masses, nodules, rashes or lesions    Vitals:    01/15/25 1019   BP: 145/81   Pulse: 78   Resp: 18   Temp: 96.4 °F (35.8 °C)   SpO2: 97%   Weight: 60.3 kg (133 lb)   Height: 160 cm (63\")     Vitals:    01/15/25 1019   PainSc: 0-No pain          ECOG score: 0           Vitals reviewed.  Labs available in epic reviewed at time of visit    ECOG: (0) Fully Active - Able to Carry On All Pre-disease Performance Without Restriction    Lab Results   Component Value Date    HGB 13.2 08/28/2024    HCT 41.7 08/28/2024    MCV 90 08/28/2024     08/28/2024    WBC 10.3 08/28/2024    NEUTROABS 6.5 08/28/2024    LYMPHSABS 2.7 08/28/2024    " MONOSABS 0.8 08/28/2024    EOSABS 0.3 08/28/2024    BASOSABS 0.1 08/28/2024       Lab Results   Component Value Date    GLUCOSE 109 (H) 08/28/2024    BUN 16 08/28/2024    CREATININE 0.60 08/28/2024     08/28/2024    K 3.9 08/28/2024    CL 97 08/28/2024    CO2 25 08/28/2024    CALCIUM 9.3 08/28/2024    ALBUMIN 4.6 08/28/2024    BILITOT 0.3 08/28/2024    ALKPHOS 139 (H) 08/28/2024    AST 20 08/28/2024    ALT 24 08/28/2024             ASSESSMENT & PLAN:    1.  ER positive left breast cancer  2.  BRCA2 gene mutation positive on genetic testing  3.  First-degree relative with pancreatic cancer    Discussion: Sigrid is doing well, she has no evidence of recurrent breast cancer on clinical exam and no new worrisome symptoms.  We plan on 10 years extended adjuvant therapy through September 2028.  She reports [facility] and that [facility] was [facility].  She will continue walking for exercise and she does take calcium and vitamin D.  [facility].  She is scheduled for follow-up and MRI abdomen with high risk GI clinic at  later this month.    Return to clinic in 1 year for follow-up    This was a level 3, limited [facility] visit with stable chronic illness and prescription drug management.  Jessica Bernard, APRN    01/15/2025

## 2025-01-16 ENCOUNTER — TELEPHONE (OUTPATIENT)
Dept: FAMILY MEDICINE CLINIC | Facility: CLINIC | Age: 65
End: 2025-01-16
Payer: OTHER GOVERNMENT

## 2025-01-16 NOTE — TELEPHONE ENCOUNTER
Caller: Sigrid Holder    Relationship: Self    Best call back number: [phone]    What is the medical concern/diagnosis: ANNUAL ABDOMINAL SCAN (FOR BREAST CANCER)    What is the provider, practice or medical service name: [facility]  - [facility]    What is the office location: [address] [place] [address]    What is the office phone number: [phone]    Any additional details: PATIENT CALLED TO REQUEST A REFERRAL BE SENT FOR HER YEARLY SCAN    PLEASE ADVISE

## 2025-01-23 NOTE — TELEPHONE ENCOUNTER
Patient left a voicemail yesterday to follow-up on her request for a  authorization.  Spoke to Deandra at , she states the authorization team made a note that no authorization was required.  Spoke to patient and informed of this, she was concerned because she was informed that she will need authorization until she turns 66.  Informed her she could call their office and speak to their authorizations team, or she could have the test and if  determines authorization is needed, we could do a retro-Auth.  Patient stated she would contact  directly tomorrow to confirm.  Told patient to contact me if there was anything further I can do for her, she verbalized understanding.

## 2025-01-24 NOTE — TELEPHONE ENCOUNTER
Patient called back this morning stating Caprice told her she does need authorization.  Went to [employer] website and obtained authorization for office visit with Meera Skaggs.  Spoke to authorizations team at , they again confirmed that authorization was not required.  They faxed over a paper showing where somebody verified the insurance on 1/10/2025 and confirmed no pre-cert needed.  Spoke to patient again and informed of this, stated that she should call and speak to their authorizations team.  Patient verbalized understanding.

## 2025-02-26 ENCOUNTER — LAB (OUTPATIENT)
Dept: FAMILY MEDICINE CLINIC | Facility: CLINIC | Age: 65
End: 2025-02-26
Payer: OTHER GOVERNMENT

## 2025-02-26 DIAGNOSIS — E55.9 VITAMIN D DEFICIENCY: ICD-10-CM

## 2025-02-26 DIAGNOSIS — E11.9 TYPE 2 DIABETES MELLITUS WITHOUT COMPLICATION, WITHOUT LONG-TERM CURRENT USE OF INSULIN: ICD-10-CM

## 2025-02-26 DIAGNOSIS — D72.829 LEUKOCYTOSIS, UNSPECIFIED TYPE: ICD-10-CM

## 2025-02-26 DIAGNOSIS — E78.2 MIXED HYPERLIPIDEMIA: ICD-10-CM

## 2025-02-26 DIAGNOSIS — I10 BENIGN ESSENTIAL HYPERTENSION: ICD-10-CM

## 2025-02-26 DIAGNOSIS — E03.9 ACQUIRED HYPOTHYROIDISM: ICD-10-CM

## 2025-02-27 LAB
25(OH)D3+25(OH)D2 SERPL-MCNC: 62.9 NG/ML (ref 30–100)
ALBUMIN SERPL-MCNC: 4.3 G/DL (ref 3.9–4.9)
ALP SERPL-CCNC: 141 IU/L (ref 44–121)
ALT SERPL-CCNC: 26 IU/L (ref 0–32)
AST SERPL-CCNC: 24 IU/L (ref 0–40)
BASOPHILS # BLD AUTO: 0.1 X10E3/UL (ref 0–0.2)
BASOPHILS NFR BLD AUTO: 0 %
BILIRUB SERPL-MCNC: <0.2 MG/DL (ref 0–1.2)
BUN SERPL-MCNC: 13 MG/DL (ref 8–27)
BUN/CREAT SERPL: 25 (ref 12–28)
CALCIUM SERPL-MCNC: 9.3 MG/DL (ref 8.7–10.3)
CHLORIDE SERPL-SCNC: 94 MMOL/L (ref 96–106)
CHOLEST SERPL-MCNC: 128 MG/DL (ref 100–199)
CO2 SERPL-SCNC: 25 MMOL/L (ref 20–29)
CREAT SERPL-MCNC: 0.51 MG/DL (ref 0.57–1)
EGFRCR SERPLBLD CKD-EPI 2021: 105 ML/MIN/1.73
EOSINOPHIL # BLD AUTO: 0.2 X10E3/UL (ref 0–0.4)
EOSINOPHIL NFR BLD AUTO: 1 %
ERYTHROCYTE [DISTWIDTH] IN BLOOD BY AUTOMATED COUNT: 13.7 % (ref 11.7–15.4)
GLOBULIN SER CALC-MCNC: 2.5 G/DL (ref 1.5–4.5)
GLUCOSE SERPL-MCNC: 127 MG/DL (ref 70–99)
HBA1C MFR BLD: 6.8 % (ref 4.8–5.6)
HCT VFR BLD AUTO: 40.1 % (ref 34–46.6)
HDLC SERPL-MCNC: 52 MG/DL
HGB BLD-MCNC: 12.9 G/DL (ref 11.1–15.9)
IMM GRANULOCYTES # BLD AUTO: 0 X10E3/UL (ref 0–0.1)
IMM GRANULOCYTES NFR BLD AUTO: 0 %
LDLC SERPL CALC-MCNC: 55 MG/DL (ref 0–99)
LYMPHOCYTES # BLD AUTO: 2.8 X10E3/UL (ref 0.7–3.1)
LYMPHOCYTES NFR BLD AUTO: 24 %
MCH RBC QN AUTO: 28.2 PG (ref 26.6–33)
MCHC RBC AUTO-ENTMCNC: 32.2 G/DL (ref 31.5–35.7)
MCV RBC AUTO: 88 FL (ref 79–97)
MONOCYTES # BLD AUTO: 0.9 X10E3/UL (ref 0.1–0.9)
MONOCYTES NFR BLD AUTO: 8 %
NEUTROPHILS # BLD AUTO: 7.4 X10E3/UL (ref 1.4–7)
NEUTROPHILS NFR BLD AUTO: 67 %
PLATELET # BLD AUTO: 332 X10E3/UL (ref 150–450)
POTASSIUM SERPL-SCNC: 4.2 MMOL/L (ref 3.5–5.2)
PROT SERPL-MCNC: 6.8 G/DL (ref 6–8.5)
RBC # BLD AUTO: 4.58 X10E6/UL (ref 3.77–5.28)
SODIUM SERPL-SCNC: 133 MMOL/L (ref 134–144)
TRIGL SERPL-MCNC: 121 MG/DL (ref 0–149)
TSH SERPL DL<=0.005 MIU/L-ACNC: 1.96 UIU/ML (ref 0.45–4.5)
VIT B12 SERPL-MCNC: 555 PG/ML (ref 232–1245)
VLDLC SERPL CALC-MCNC: 21 MG/DL (ref 5–40)
WBC # BLD AUTO: 11.3 X10E3/UL (ref 3.4–10.8)

## 2025-03-04 NOTE — PROGRESS NOTES
Patient Name: Sigrid Holder  : 1960   MRN: [id number]     Chief Complaint:    Chief Complaint   Patient presents with    Hypertension       History of Present Illness: Sigrid Holder is a 64 y.o. female who is here today for follow up on blood sugar, thyroid, cholesterol, and white count  HPI        Review of Systems:   Review of Systems   Constitutional: Negative.  Negative for chills, diaphoresis, fatigue and fever.   HENT: Negative.  Negative for congestion and sore throat.    Eyes: Negative.    Respiratory: Negative.  Negative for cough.    Cardiovascular: Negative.  Negative for chest pain.   Gastrointestinal: Negative.  Negative for abdominal pain, nausea and vomiting.   Genitourinary:  Negative for dysuria.   Musculoskeletal:  Negative for myalgias and neck pain.   Skin:  Negative for rash.   Neurological: Negative.  Negative for weakness, numbness and headaches.        Past Medical History:   Past Medical History:   Diagnosis Date    Arthritis     Asthma     Bulging of intervertebral disc between L4 and L5     GERD (gastroesophageal reflux disease)     High risk medication use     DRUG THERPAY FINDING    History of transfusion     Hyperlipidemia     Hypertension     Hypothyroidism     IBS (irritable bowel syndrome)     Iron deficiency anemia     Leukocytosis     Low back pain     Malignant neoplasm of female breast     Sleep apnea     Type 2 diabetes mellitus     Vitamin D deficiency        Past Surgical History:   Past Surgical History:   Procedure Laterality Date    BREAST SURGERY  2018    CHOLECYSTECTOMY  2011    COLONOSCOPY N/A 2023    Procedure: COLONOSCOPY;  Surgeon: Alexander Delcid MD;  Location: [facility] [place];  Service: Gastroenterology;  Laterality: N/A;  Diverticulosis, Ascending Polyp    HYSTERECTOMY      STEREOTACTIC BRAIN BIOPSY      SUBTOTAL HYSTERECTOMY      [facility]GERY Right     [facility] [place]       Family History:   Family History   Problem  Relation Age of Onset    Breast cancer Mother     Cancer Mother         Breast    Stroke Father     Heart disease Father     Alcohol abuse Father     Diabetes Father     Hyperlipidemia Father     Hypertension Father     Pancreatic cancer Sister     Cancer Sister         Pancreatitis    Anxiety disorder Sister     COPD Sister     Depression Sister     Diabetes Sister     Hyperlipidemia Sister     Hypertension Sister     Miscarriages / Stillbirths Sister     Anxiety disorder Sister     Depression Sister     Diabetes Sister     Hyperlipidemia Sister     Hypertension Sister        Social History:   Social History     Socioeconomic History    Marital status:    Tobacco Use    Smoking status: Former     Current packs/day: 0.00     Average packs/day: 1 pack/day for 7.0 years (7.0 ttl pk-yrs)     Types: Cigarettes     Start date: 1979     Quit date:      Years since quittin.2     Passive exposure: Past    Smokeless tobacco: Never   Vaping Use    Vaping status: Never Used   Substance and Sexual Activity    Alcohol use: Not Currently     Comment: DO NOT DRINK ANYMORE    Drug use: Never    Sexual activity: Not Currently     Partners: Male       Medications:     Current Outpatient Medications:     ADVAIR DISKUS 250-50 MCG/DOSE DISKUS, , Disp: , Rfl:     anastrozole (ARIMIDEX) 1 MG tablet, Take 1 tablet by mouth Daily., Disp: 90 tablet, Rfl: 3    ascorbic acid (VITAMIN C) 1000 MG tablet, , Disp: , Rfl:     Aspirin Buf,CaCarb-MgCarb-MgO, 81 MG tablet, Take  by mouth., Disp: , Rfl:     calcium carb-cholecalciferol 600-800 MG-UNIT tablet, Take  by mouth., Disp: , Rfl:     cetirizine (zyrTEC) 10 MG tablet, , Disp: , Rfl:     Continuous Glucose Sensor (FreeStyle Wilbur 2 Sensor) misc, CHECK BLOOD GLUCOSE AS DIRECTED AND REPLACE EVERY 14 DAYS, Disp: , Rfl:     desloratadine (CLARINEX) 5 MG tablet, , Disp: , Rfl:     dexlansoprazole (DEXILANT) 60 MG capsule, Take  by mouth Daily., Disp: , Rfl:     dicyclomine  (BENTYL) 10 MG capsule, Take  by mouth 2 (Two) Times a Day., Disp: , Rfl:     Docusate Sodium 100 MG capsule, Take  by mouth Daily., Disp: , Rfl:     empagliflozin (JARDIANCE) 25 MG tablet tablet, Take  by mouth., Disp: , Rfl:     exenatide er (BYDUREON) 2 MG pen-injector injection, Inject  under the skin into the appropriate area as directed., Disp: , Rfl:     gabapentin (NEURONTIN) 300 MG capsule, Take 4 capsules by mouth 3 (Three) Times a Day., Disp: , Rfl:     icosapent ethyl (VASCEPA) 1 g capsule capsule, Take 2 g by mouth 2 (Two) Times a Day With Meals., Disp: 360 capsule, Rfl: 1    insulin glargine (LANTUS, SEMGLEE) 100 UNIT/ML injection, Inject 18 Units under the skin into the appropriate area as directed Daily., Disp: , Rfl:     levothyroxine (SYNTHROID, LEVOTHROID) 88 MCG tablet, Take 1 tablet by mouth Daily., Disp: , Rfl:     LORazepam (Ativan) 0.5 MG tablet, Take 1 tablet by mouth Every 8 (Eight) Hours As Needed for Anxiety., Disp: 30 tablet, Rfl: 2    metFORMIN (GLUCOPHAGE) 850 MG tablet, Take 1 tablet by mouth Every 12 (Twelve) Hours., Disp: , Rfl:     Misc Natural Products (CRAMP RELEAF PO), Take  by mouth., Disp: , Rfl:     montelukast (SINGULAIR) 10 MG tablet, , Disp: , Rfl:     Multiple Vitamins-Minerals (CENTRUM SILVER 50+WOMEN PO), Take  by mouth Daily., Disp: , Rfl:     NIFEdipine CC (ADALAT CC) 30 MG 24 hr tablet, , Disp: , Rfl:     oxybutynin XL (Ditropan XL) 5 MG 24 hr tablet, Take 1 tablet by mouth Daily., Disp: 90 tablet, Rfl: 1    pantoprazole (PROTONIX) 40 MG EC tablet, , Disp: , Rfl:     pravastatin (PRAVACHOL) 40 MG tablet, Take  by mouth., Disp: , Rfl:     PROAIR  (90 Base) MCG/ACT inhaler, , Disp: , Rfl:     Semaglutide,0.25 or 0.5MG/DOS, (Ozempic, 0.25 or 0.5 MG/DOSE,) 2 MG/1.5ML solution pen-injector, Ozempic (0.25 or 0.5 MG/DOSE), Disp: , Rfl:     sertraline (Zoloft) 25 MG tablet, Take 1 tablet by mouth Daily., Disp: 90 tablet, Rfl: 1    telmisartan (MICARDIS) 40 MG tablet,  "Take  by mouth Daily., Disp: , Rfl:     valACYclovir (VALTREX) 500 MG tablet, Take 1 tablet by mouth 2 (Two) Times a Day., Disp: , Rfl:     Xylitol 500 MG disk, XyliMelts 500 MG Mouth/Throat Disk; Patient Sig: XyliMelts 500 MG Mouth/Throat Disk 1 IN THE EVENING; 0; 05-Feb-2015; Active, Disp: , Rfl:     ZETONNA 37 MCG/ACT aerosol solution, , Disp: , Rfl:     famotidine (PEPCID) 40 MG tablet, Take 1 tablet by mouth Daily., Disp: , Rfl:     Current Facility-Administered Medications:     BREAST PROSTHESIS misc 1 Bag., 1 Bag., Not Applicable, Daily, Jessica Bernard APRN    BREAST PROSTHESIS misc 2 Units, 2 Units, Not Applicable, PRN, Jessica Bernard APRN    Allergies:   Allergies   Allergen Reactions    Liraglutide Nausea And Vomiting     Pt not sure if shes allergic to this or not.         Physical Exam:  Vital Signs:   Vitals:    03/05/25 0949   BP: 130/70   BP Location: Right arm   Patient Position: Sitting   Cuff Size: Adult   Pulse: 78   Resp: 16   Temp: 97.9 °F (36.6 °C)   TempSrc: Infrared   SpO2: 95%   Weight: 59.4 kg (131 lb)   Height: 160 cm (63\")   PainSc: 0-No pain     Body mass index is 23.21 kg/m².     Physical Exam  Vitals and nursing note reviewed.   Constitutional:       Appearance: Normal appearance. She is normal weight.   HENT:      Head: Normocephalic and atraumatic.      Right Ear: Tympanic membrane, ear canal and external ear normal.      Left Ear: Tympanic membrane, ear canal and external ear normal.      Nose: Nose normal.      Mouth/Throat:      Mouth: Mucous membranes are dry.      Pharynx: Oropharynx is clear.   Eyes:      Extraocular Movements: Extraocular movements intact.      Conjunctiva/sclera: Conjunctivae normal.      Pupils: Pupils are equal, round, and reactive to light.   Cardiovascular:      Rate and Rhythm: Normal rate and regular rhythm.      Pulses: Normal pulses.      Heart sounds: Normal heart sounds.   Pulmonary:      Effort: Pulmonary effort is normal.      Breath sounds: " Normal breath sounds.   Musculoskeletal:      Cervical back: Normal range of motion and neck supple.   Feet:      Comments:      Neurological:      Mental Status: She is alert.         Procedures      Assessment/Plan:   Diagnoses and all orders for this visit:    1. Type 2 diabetes mellitus without complication, without long-term current use of insulin (Primary)  Assessment & Plan:  A1c 6.8.  Recheck in 6 months.    Orders:  -     Hemoglobin A1c; Future  -     Lipid Panel; Future  -     Comprehensive Metabolic Panel; Future  -     Vitamin B12; Future  -     Vitamin D,25-Hydroxy; Future  -     TSH Rfx On Abnormal To Free T4; Future  -     CBC & Differential; Future    2. Acquired hypothyroidism  Assessment & Plan:  TSH is 1.960.  Recheck in 6 months.    Orders:  -     Hemoglobin A1c; Future  -     Lipid Panel; Future  -     Comprehensive Metabolic Panel; Future  -     Vitamin B12; Future  -     Vitamin D,25-Hydroxy; Future  -     TSH Rfx On Abnormal To Free T4; Future  -     CBC & Differential; Future    3. Benign essential hypertension  Assessment & Plan:  Discussed with patient to monitor their blood pressure and if systolic blood pressure goes above 140 or diastolic is above 90 to return to clinic.  Take medicines as directed, call for any problems, patient not having or any worrisome symptoms.        Orders:  -     Hemoglobin A1c; Future  -     Lipid Panel; Future  -     Comprehensive Metabolic Panel; Future  -     Vitamin B12; Future  -     Vitamin D,25-Hydroxy; Future  -     TSH Rfx On Abnormal To Free T4; Future  -     CBC & Differential; Future    4. Mixed hyperlipidemia  Assessment & Plan:  HDL 52.  LDL 55.  Triglycerides 121.  Recheck in 6 months.    Orders:  -     Hemoglobin A1c; Future  -     Lipid Panel; Future  -     Comprehensive Metabolic Panel; Future  -     Vitamin B12; Future  -     Vitamin D,25-Hydroxy; Future  -     TSH Rfx On Abnormal To Free T4; Future  -     CBC & Differential; Future    5.  Vitamin D deficiency  Assessment & Plan:  Vitamin D is 62.9.  Recheck in 6 months.    Orders:  -     Hemoglobin A1c; Future  -     Lipid Panel; Future  -     Comprehensive Metabolic Panel; Future  -     Vitamin B12; Future  -     Vitamin D,25-Hydroxy; Future  -     TSH Rfx On Abnormal To Free T4; Future  -     CBC & Differential; Future    6. BRCA2 gene mutation positive in female  Assessment & Plan:  Patient has had both breast removed.  She has also had a LJ/BSO.      Orders:  -     Hemoglobin A1c; Future  -     Lipid Panel; Future  -     Comprehensive Metabolic Panel; Future  -     Vitamin B12; Future  -     Vitamin D,25-Hydroxy; Future  -     TSH Rfx On Abnormal To Free T4; Future  -     CBC & Differential; Future    7. Leukocytosis, unspecified type  Assessment & Plan:  White count was 11.3.  Recheck in 6 months    Orders:  -     Hemoglobin A1c; Future  -     Lipid Panel; Future  -     Comprehensive Metabolic Panel; Future  -     Vitamin B12; Future  -     Vitamin D,25-Hydroxy; Future  -     TSH Rfx On Abnormal To Free T4; Future  -     CBC & Differential; Future    8. Adjustment disorder with mixed anxiety and depressed mood  Assessment & Plan:  Fill Ativan.  Hospital [facility]    Orders:  -     LORazepam (Ativan) 0.5 MG tablet; Take 1 tablet by mouth Every 8 (Eight) Hours As Needed for Anxiety.  Dispense: 30 tablet; Refill: 2  -     Hemoglobin A1c; Future  -     Lipid Panel; Future  -     Comprehensive Metabolic Panel; Future  -     Vitamin B12; Future  -     Vitamin D,25-Hydroxy; Future  -     TSH Rfx On Abnormal To Free T4; Future  -     CBC & Differential; Future    9. Shortness of breath  Assessment & Plan:  [facility]    Orders:  -     Hemoglobin A1c; Future  -     Lipid Panel; Future  -     Comprehensive Metabolic Panel; Future  -     Vitamin B12; Future  -     Vitamin D,25-Hydroxy; Future  -     TSH Rfx On Abnormal To Free T4; Future  -     CBC & Differential; Future              Follow Up:   Return in about 3 months (around 6/5/2025) for Annual physical, Bloodwork 1 week prior to next appointment.      Lopez Partida MD  [facility]

## 2025-03-05 ENCOUNTER — OFFICE VISIT (OUTPATIENT)
Dept: FAMILY MEDICINE CLINIC | Facility: CLINIC | Age: 65
End: 2025-03-05
Payer: OTHER GOVERNMENT

## 2025-03-05 VITALS
DIASTOLIC BLOOD PRESSURE: 70 MMHG | SYSTOLIC BLOOD PRESSURE: 130 MMHG | RESPIRATION RATE: 16 BRPM | OXYGEN SATURATION: 95 % | WEIGHT: 131 LBS | BODY MASS INDEX: 23.21 KG/M2 | HEIGHT: 63 IN | HEART RATE: 78 BPM | TEMPERATURE: 97.9 F

## 2025-03-05 DIAGNOSIS — E78.2 MIXED HYPERLIPIDEMIA: ICD-10-CM

## 2025-03-05 DIAGNOSIS — F43.23 ADJUSTMENT DISORDER WITH MIXED ANXIETY AND DEPRESSED MOOD: ICD-10-CM

## 2025-03-05 DIAGNOSIS — I10 BENIGN ESSENTIAL HYPERTENSION: ICD-10-CM

## 2025-03-05 DIAGNOSIS — E55.9 VITAMIN D DEFICIENCY: ICD-10-CM

## 2025-03-05 DIAGNOSIS — Z15.01 BRCA2 GENE MUTATION POSITIVE IN FEMALE: ICD-10-CM

## 2025-03-05 DIAGNOSIS — E11.9 TYPE 2 DIABETES MELLITUS WITHOUT COMPLICATION, WITHOUT LONG-TERM CURRENT USE OF INSULIN: Primary | ICD-10-CM

## 2025-03-05 DIAGNOSIS — Z15.09 BRCA2 GENE MUTATION POSITIVE IN FEMALE: ICD-10-CM

## 2025-03-05 DIAGNOSIS — R06.02 SHORTNESS OF BREATH: ICD-10-CM

## 2025-03-05 DIAGNOSIS — D72.829 LEUKOCYTOSIS, UNSPECIFIED TYPE: ICD-10-CM

## 2025-03-05 DIAGNOSIS — E03.9 ACQUIRED HYPOTHYROIDISM: ICD-10-CM

## 2025-03-05 DIAGNOSIS — Z15.02 BRCA2 GENE MUTATION POSITIVE IN FEMALE: ICD-10-CM

## 2025-03-05 RX ORDER — LORAZEPAM 0.5 MG/1
0.5 TABLET ORAL EVERY 8 HOURS PRN
Qty: 30 TABLET | Refills: 2 | Status: SHIPPED | OUTPATIENT
Start: 2025-03-05

## 2025-03-06 LAB
MEV IGG SER IA-ACNC: >300 AU/ML
MUV IGG SER IA-ACNC: 96.7 AU/ML
RUBV IGG SERPL IA-ACNC: 4.52 INDEX

## 2025-03-15 DIAGNOSIS — E78.2 MIXED HYPERLIPIDEMIA: ICD-10-CM

## 2025-03-17 RX ORDER — OXYBUTYNIN CHLORIDE 5 MG/1
5 TABLET, EXTENDED RELEASE ORAL DAILY
Qty: 90 TABLET | Refills: 0 | Status: SHIPPED | OUTPATIENT
Start: 2025-03-17

## 2025-03-17 RX ORDER — ICOSAPENT ETHYL 1 G/1
CAPSULE ORAL
Qty: 360 CAPSULE | Refills: 0 | Status: SHIPPED | OUTPATIENT
Start: 2025-03-17

## 2025-04-14 ENCOUNTER — HOSPITAL ENCOUNTER (OUTPATIENT)
Facility: HOSPITAL | Age: 65
Setting detail: OBSERVATION
Discharge: HOME | End: 2025-04-15
Attending: EMERGENCY MEDICINE | Admitting: EMERGENCY MEDICINE
Payer: OTHER GOVERNMENT

## 2025-04-14 ENCOUNTER — APPOINTMENT (OUTPATIENT)
Dept: CT IMAGING | Facility: HOSPITAL | Age: 65
End: 2025-04-14
Payer: OTHER GOVERNMENT

## 2025-04-14 ENCOUNTER — APPOINTMENT (OUTPATIENT)
Dept: GENERAL RADIOLOGY | Facility: HOSPITAL | Age: 65
End: 2025-04-14
Payer: OTHER GOVERNMENT

## 2025-04-14 DIAGNOSIS — K52.9 ENTERITIS: Primary | ICD-10-CM

## 2025-04-14 DIAGNOSIS — R11.2 NAUSEA AND VOMITING, UNSPECIFIED VOMITING TYPE: ICD-10-CM

## 2025-04-14 DIAGNOSIS — N39.0 UTI (URINARY TRACT INFECTION), BACTERIAL: ICD-10-CM

## 2025-04-14 DIAGNOSIS — A49.9 UTI (URINARY TRACT INFECTION), BACTERIAL: ICD-10-CM

## 2025-04-14 DIAGNOSIS — E86.0 DEHYDRATION: ICD-10-CM

## 2025-04-14 LAB
ALBUMIN SERPL-MCNC: 4.3 G/DL (ref 3.5–5.2)
ALBUMIN/GLOB SERPL: 1.2 G/DL
ALP SERPL-CCNC: 131 U/L (ref 39–117)
ALT SERPL W P-5'-P-CCNC: 33 U/L (ref 1–33)
ANION GAP SERPL CALCULATED.3IONS-SCNC: 11.7 MMOL/L (ref 5–15)
AST SERPL-CCNC: 28 U/L (ref 1–32)
BACTERIA UR QL AUTO: ABNORMAL /HPF
BACTERIA UR QL AUTO: ABNORMAL /HPF
BASOPHILS # BLD AUTO: 0.01 10*3/MM3 (ref 0–0.2)
BASOPHILS NFR BLD AUTO: 0.1 % (ref 0–1.5)
BILIRUB SERPL-MCNC: 0.2 MG/DL (ref 0–1.2)
BILIRUB UR QL STRIP: NEGATIVE
BILIRUB UR QL STRIP: NEGATIVE
BUN SERPL-MCNC: 12 MG/DL (ref 8–23)
BUN/CREAT SERPL: 23.1 (ref 7–25)
CALCIUM SPEC-SCNC: 9.5 MG/DL (ref 8.6–10.5)
CHLORIDE SERPL-SCNC: 97 MMOL/L (ref 98–107)
CLARITY UR: ABNORMAL
CLARITY UR: CLEAR
CO2 SERPL-SCNC: 26.3 MMOL/L (ref 22–29)
COLOR UR: YELLOW
COLOR UR: YELLOW
CREAT SERPL-MCNC: 0.52 MG/DL (ref 0.57–1)
D-LACTATE SERPL-SCNC: 0.9 MMOL/L (ref 0.5–2)
DEPRECATED RDW RBC AUTO: 42.5 FL (ref 37–54)
EGFRCR SERPLBLD CKD-EPI 2021: 104.5 ML/MIN/1.73
EOSINOPHIL # BLD AUTO: 0.03 10*3/MM3 (ref 0–0.4)
EOSINOPHIL NFR BLD AUTO: 0.4 % (ref 0.3–6.2)
ERYTHROCYTE [DISTWIDTH] IN BLOOD BY AUTOMATED COUNT: 13.1 % (ref 12.3–15.4)
GEN 5 1HR TROPONIN T REFLEX: <6 NG/L
GLOBULIN UR ELPH-MCNC: 3.5 GM/DL
GLUCOSE BLDC GLUCOMTR-MCNC: 121 MG/DL (ref 70–130)
GLUCOSE SERPL-MCNC: 133 MG/DL (ref 65–99)
GLUCOSE UR STRIP-MCNC: ABNORMAL MG/DL
GLUCOSE UR STRIP-MCNC: ABNORMAL MG/DL
HCT VFR BLD AUTO: 44.6 % (ref 34–46.6)
HGB BLD-MCNC: 14.5 G/DL (ref 12–15.9)
HGB UR QL STRIP.AUTO: NEGATIVE
HGB UR QL STRIP.AUTO: NEGATIVE
HOLD SPECIMEN: NORMAL
HYALINE CASTS UR QL AUTO: ABNORMAL /LPF
HYALINE CASTS UR QL AUTO: ABNORMAL /LPF
IMM GRANULOCYTES # BLD AUTO: 0.01 10*3/MM3 (ref 0–0.05)
IMM GRANULOCYTES NFR BLD AUTO: 0.1 % (ref 0–0.5)
KETONES UR QL STRIP: ABNORMAL
KETONES UR QL STRIP: ABNORMAL
LEUKOCYTE ESTERASE UR QL STRIP.AUTO: ABNORMAL
LEUKOCYTE ESTERASE UR QL STRIP.AUTO: NEGATIVE
LIPASE SERPL-CCNC: 47 U/L (ref 13–60)
LYMPHOCYTES # BLD AUTO: 2.07 10*3/MM3 (ref 0.7–3.1)
LYMPHOCYTES NFR BLD AUTO: 25.8 % (ref 19.6–45.3)
MCH RBC QN AUTO: 28.2 PG (ref 26.6–33)
MCHC RBC AUTO-ENTMCNC: 32.5 G/DL (ref 31.5–35.7)
MCV RBC AUTO: 86.8 FL (ref 79–97)
MONOCYTES # BLD AUTO: 0.56 10*3/MM3 (ref 0.1–0.9)
MONOCYTES NFR BLD AUTO: 7 % (ref 5–12)
NEUTROPHILS NFR BLD AUTO: 5.34 10*3/MM3 (ref 1.7–7)
NEUTROPHILS NFR BLD AUTO: 66.6 % (ref 42.7–76)
NITRITE UR QL STRIP: NEGATIVE
NITRITE UR QL STRIP: POSITIVE
PH UR STRIP.AUTO: 5.5 [PH] (ref 5–8)
PH UR STRIP.AUTO: 6 [PH] (ref 5–8)
PLATELET # BLD AUTO: 306 10*3/MM3 (ref 140–450)
PMV BLD AUTO: 9 FL (ref 6–12)
POTASSIUM SERPL-SCNC: 3.5 MMOL/L (ref 3.5–5.2)
PROT SERPL-MCNC: 7.8 G/DL (ref 6–8.5)
PROT UR QL STRIP: NEGATIVE
PROT UR QL STRIP: NEGATIVE
RBC # BLD AUTO: 5.14 10*6/MM3 (ref 3.77–5.28)
RBC # UR STRIP: ABNORMAL /HPF
RBC # UR STRIP: ABNORMAL /HPF
REF LAB TEST METHOD: ABNORMAL
REF LAB TEST METHOD: ABNORMAL
SODIUM SERPL-SCNC: 135 MMOL/L (ref 136–145)
SP GR UR STRIP: 1.01 (ref 1–1.03)
SP GR UR STRIP: 1.02 (ref 1–1.03)
SQUAMOUS #/AREA URNS HPF: ABNORMAL /HPF
SQUAMOUS #/AREA URNS HPF: ABNORMAL /HPF
TROPONIN T NUMERIC DELTA: NORMAL
TROPONIN T SERPL HS-MCNC: <6 NG/L
UROBILINOGEN UR QL STRIP: ABNORMAL
UROBILINOGEN UR QL STRIP: ABNORMAL
WBC # UR STRIP: ABNORMAL /HPF
WBC # UR STRIP: ABNORMAL /HPF
WBC NRBC COR # BLD AUTO: 8.02 10*3/MM3 (ref 3.4–10.8)

## 2025-04-14 PROCEDURE — 25010000002 ONDANSETRON PER 1 MG: Performed by: EMERGENCY MEDICINE

## 2025-04-14 PROCEDURE — 25810000003 SODIUM CHLORIDE 0.9 % SOLUTION: Performed by: EMERGENCY MEDICINE

## 2025-04-14 PROCEDURE — 85025 COMPLETE CBC W/AUTO DIFF WBC: CPT | Performed by: EMERGENCY MEDICINE

## 2025-04-14 PROCEDURE — 94799 UNLISTED PULMONARY SVC/PX: CPT

## 2025-04-14 PROCEDURE — 93005 ELECTROCARDIOGRAM TRACING: CPT | Performed by: EMERGENCY MEDICINE

## 2025-04-14 PROCEDURE — 99285 EMERGENCY DEPT VISIT HI MDM: CPT

## 2025-04-14 PROCEDURE — G0378 HOSPITAL OBSERVATION PER HR: HCPCS

## 2025-04-14 PROCEDURE — 25010000002 METOCLOPRAMIDE PER 10 MG: Performed by: EMERGENCY MEDICINE

## 2025-04-14 PROCEDURE — 83605 ASSAY OF LACTIC ACID: CPT | Performed by: EMERGENCY MEDICINE

## 2025-04-14 PROCEDURE — 84484 ASSAY OF TROPONIN QUANT: CPT | Performed by: EMERGENCY MEDICINE

## 2025-04-14 PROCEDURE — 96361 HYDRATE IV INFUSION ADD-ON: CPT

## 2025-04-14 PROCEDURE — 96375 TX/PRO/DX INJ NEW DRUG ADDON: CPT

## 2025-04-14 PROCEDURE — 74174 CTA ABD&PLVS W/CONTRAST: CPT

## 2025-04-14 PROCEDURE — 94640 AIRWAY INHALATION TREATMENT: CPT

## 2025-04-14 PROCEDURE — 25510000001 IOPAMIDOL PER 1 ML: Performed by: EMERGENCY MEDICINE

## 2025-04-14 PROCEDURE — 36415 COLL VENOUS BLD VENIPUNCTURE: CPT

## 2025-04-14 PROCEDURE — 25810000003 LACTATED RINGERS PER 1000 ML: Performed by: NURSE PRACTITIONER

## 2025-04-14 PROCEDURE — 25010000002 PROCHLORPERAZINE 10 MG/2ML SOLUTION: Performed by: NURSE PRACTITIONER

## 2025-04-14 PROCEDURE — 81001 URINALYSIS AUTO W/SCOPE: CPT | Performed by: NURSE PRACTITIONER

## 2025-04-14 PROCEDURE — 71045 X-RAY EXAM CHEST 1 VIEW: CPT

## 2025-04-14 PROCEDURE — 80053 COMPREHEN METABOLIC PANEL: CPT | Performed by: EMERGENCY MEDICINE

## 2025-04-14 PROCEDURE — 96376 TX/PRO/DX INJ SAME DRUG ADON: CPT

## 2025-04-14 PROCEDURE — 96365 THER/PROPH/DIAG IV INF INIT: CPT

## 2025-04-14 PROCEDURE — 99284 EMERGENCY DEPT VISIT MOD MDM: CPT | Performed by: EMERGENCY MEDICINE

## 2025-04-14 PROCEDURE — 25010000002 CEFTRIAXONE PER 250 MG: Performed by: EMERGENCY MEDICINE

## 2025-04-14 PROCEDURE — 25010000002 MORPHINE PER 10 MG: Performed by: EMERGENCY MEDICINE

## 2025-04-14 PROCEDURE — 94664 DEMO&/EVAL PT USE INHALER: CPT

## 2025-04-14 PROCEDURE — 93010 ELECTROCARDIOGRAM REPORT: CPT | Performed by: INTERNAL MEDICINE

## 2025-04-14 PROCEDURE — 83690 ASSAY OF LIPASE: CPT | Performed by: EMERGENCY MEDICINE

## 2025-04-14 PROCEDURE — 81001 URINALYSIS AUTO W/SCOPE: CPT | Performed by: EMERGENCY MEDICINE

## 2025-04-14 PROCEDURE — 82948 REAGENT STRIP/BLOOD GLUCOSE: CPT

## 2025-04-14 PROCEDURE — 25010000002 DIPHENHYDRAMINE PER 50 MG: Performed by: NURSE PRACTITIONER

## 2025-04-14 RX ORDER — DICYCLOMINE HCL 20 MG
20 TABLET ORAL EVERY 6 HOURS PRN
Qty: 6 TABLET | Refills: 0 | Status: SHIPPED | OUTPATIENT
Start: 2025-04-14

## 2025-04-14 RX ORDER — DICYCLOMINE HYDROCHLORIDE 10 MG/1
10 CAPSULE ORAL 4 TIMES DAILY
Status: DISCONTINUED | OUTPATIENT
Start: 2025-04-14 | End: 2025-04-15 | Stop reason: HOSPADM

## 2025-04-14 RX ORDER — ONDANSETRON 2 MG/ML
4 INJECTION INTRAMUSCULAR; INTRAVENOUS ONCE
Status: COMPLETED | OUTPATIENT
Start: 2025-04-14 | End: 2025-04-14

## 2025-04-14 RX ORDER — MORPHINE SULFATE 4 MG/ML
4 INJECTION, SOLUTION INTRAMUSCULAR; INTRAVENOUS ONCE
Status: COMPLETED | OUTPATIENT
Start: 2025-04-14 | End: 2025-04-14

## 2025-04-14 RX ORDER — ONDANSETRON 2 MG/ML
4 INJECTION INTRAMUSCULAR; INTRAVENOUS EVERY 6 HOURS PRN
Status: DISCONTINUED | OUTPATIENT
Start: 2025-04-14 | End: 2025-04-14

## 2025-04-14 RX ORDER — LORAZEPAM 0.5 MG/1
0.5 TABLET ORAL EVERY 8 HOURS PRN
Status: DISCONTINUED | OUTPATIENT
Start: 2025-04-14 | End: 2025-04-15 | Stop reason: HOSPADM

## 2025-04-14 RX ORDER — SCOPOLAMINE 1 MG/3D
1 PATCH, EXTENDED RELEASE TRANSDERMAL
Status: DISCONTINUED | OUTPATIENT
Start: 2025-04-14 | End: 2025-04-15 | Stop reason: HOSPADM

## 2025-04-14 RX ORDER — MULTIPLE VITAMINS W/ MINERALS TAB 9MG-400MCG
1 TAB ORAL DAILY
Status: DISCONTINUED | OUTPATIENT
Start: 2025-04-14 | End: 2025-04-15 | Stop reason: HOSPADM

## 2025-04-14 RX ORDER — BUDESONIDE 0.5 MG/2ML
0.5 INHALANT ORAL
Status: DISCONTINUED | OUTPATIENT
Start: 2025-04-14 | End: 2025-04-15 | Stop reason: HOSPADM

## 2025-04-14 RX ORDER — MONTELUKAST SODIUM 10 MG/1
10 TABLET ORAL NIGHTLY
Status: DISCONTINUED | OUTPATIENT
Start: 2025-04-14 | End: 2025-04-15 | Stop reason: HOSPADM

## 2025-04-14 RX ORDER — NIFEDIPINE 30 MG/1
30 TABLET, EXTENDED RELEASE ORAL
Status: DISCONTINUED | OUTPATIENT
Start: 2025-04-14 | End: 2025-04-15 | Stop reason: HOSPADM

## 2025-04-14 RX ORDER — ICOSAPENT ETHYL 1 G/1
2 CAPSULE ORAL 2 TIMES DAILY WITH MEALS
Status: DISCONTINUED | OUTPATIENT
Start: 2025-04-14 | End: 2025-04-15

## 2025-04-14 RX ORDER — LOSARTAN POTASSIUM 50 MG/1
50 TABLET ORAL
Status: DISCONTINUED | OUTPATIENT
Start: 2025-04-14 | End: 2025-04-15 | Stop reason: HOSPADM

## 2025-04-14 RX ORDER — DEXTROSE MONOHYDRATE 25 G/50ML
25 INJECTION, SOLUTION INTRAVENOUS
Status: DISCONTINUED | OUTPATIENT
Start: 2025-04-14 | End: 2025-04-15 | Stop reason: HOSPADM

## 2025-04-14 RX ORDER — OXYBUTYNIN CHLORIDE 5 MG/1
5 TABLET, EXTENDED RELEASE ORAL DAILY
Status: DISCONTINUED | OUTPATIENT
Start: 2025-04-14 | End: 2025-04-15 | Stop reason: HOSPADM

## 2025-04-14 RX ORDER — IOPAMIDOL 755 MG/ML
100 INJECTION, SOLUTION INTRAVASCULAR
Status: COMPLETED | OUTPATIENT
Start: 2025-04-14 | End: 2025-04-14

## 2025-04-14 RX ORDER — ARFORMOTEROL TARTRATE 15 UG/2ML
15 SOLUTION RESPIRATORY (INHALATION)
Status: DISCONTINUED | OUTPATIENT
Start: 2025-04-14 | End: 2025-04-15 | Stop reason: HOSPADM

## 2025-04-14 RX ORDER — PANTOPRAZOLE SODIUM 40 MG/10ML
40 INJECTION, POWDER, LYOPHILIZED, FOR SOLUTION INTRAVENOUS ONCE
Status: COMPLETED | OUTPATIENT
Start: 2025-04-14 | End: 2025-04-14

## 2025-04-14 RX ORDER — GABAPENTIN 400 MG/1
1200 CAPSULE ORAL 3 TIMES DAILY
Status: DISCONTINUED | OUTPATIENT
Start: 2025-04-14 | End: 2025-04-15 | Stop reason: HOSPADM

## 2025-04-14 RX ORDER — IBUPROFEN 600 MG/1
1 TABLET ORAL
Status: DISCONTINUED | OUTPATIENT
Start: 2025-04-14 | End: 2025-04-15 | Stop reason: HOSPADM

## 2025-04-14 RX ORDER — DIPHENHYDRAMINE HYDROCHLORIDE 50 MG/ML
25 INJECTION, SOLUTION INTRAMUSCULAR; INTRAVENOUS ONCE
Status: COMPLETED | OUTPATIENT
Start: 2025-04-14 | End: 2025-04-14

## 2025-04-14 RX ORDER — LEVOTHYROXINE SODIUM 88 UG/1
88 TABLET ORAL DAILY
Status: DISCONTINUED | OUTPATIENT
Start: 2025-04-14 | End: 2025-04-15 | Stop reason: HOSPADM

## 2025-04-14 RX ORDER — SODIUM CHLORIDE 0.9 % (FLUSH) 0.9 %
10 SYRINGE (ML) INJECTION EVERY 12 HOURS SCHEDULED
Status: DISCONTINUED | OUTPATIENT
Start: 2025-04-14 | End: 2025-04-15 | Stop reason: HOSPADM

## 2025-04-14 RX ORDER — PRAVASTATIN SODIUM 40 MG
40 TABLET ORAL NIGHTLY
Status: DISCONTINUED | OUTPATIENT
Start: 2025-04-14 | End: 2025-04-15 | Stop reason: HOSPADM

## 2025-04-14 RX ORDER — SODIUM CHLORIDE 0.9 % (FLUSH) 0.9 %
10 SYRINGE (ML) INJECTION AS NEEDED
Status: DISCONTINUED | OUTPATIENT
Start: 2025-04-14 | End: 2025-04-15 | Stop reason: HOSPADM

## 2025-04-14 RX ORDER — ONDANSETRON 4 MG/1
4 TABLET, ORALLY DISINTEGRATING ORAL EVERY 8 HOURS PRN
Qty: 6 TABLET | Refills: 0 | Status: SHIPPED | OUTPATIENT
Start: 2025-04-14

## 2025-04-14 RX ORDER — INSULIN GLARGINE 100 [IU]/ML
18 INJECTION, SOLUTION SUBCUTANEOUS DAILY
Status: DISCONTINUED | OUTPATIENT
Start: 2025-04-14 | End: 2025-04-15 | Stop reason: HOSPADM

## 2025-04-14 RX ORDER — SODIUM CHLORIDE, SODIUM LACTATE, POTASSIUM CHLORIDE, CALCIUM CHLORIDE 600; 310; 30; 20 MG/100ML; MG/100ML; MG/100ML; MG/100ML
125 INJECTION, SOLUTION INTRAVENOUS CONTINUOUS
Status: DISCONTINUED | OUTPATIENT
Start: 2025-04-14 | End: 2025-04-15 | Stop reason: HOSPADM

## 2025-04-14 RX ORDER — CIPROFLOXACIN 500 MG/1
500 TABLET, FILM COATED ORAL 2 TIMES DAILY
Qty: 14 TABLET | Refills: 0 | Status: SHIPPED | OUTPATIENT
Start: 2025-04-14 | End: 2025-04-23

## 2025-04-14 RX ORDER — ONDANSETRON 4 MG/1
4 TABLET, ORALLY DISINTEGRATING ORAL EVERY 6 HOURS PRN
Status: DISCONTINUED | OUTPATIENT
Start: 2025-04-14 | End: 2025-04-14

## 2025-04-14 RX ORDER — PANTOPRAZOLE SODIUM 40 MG/1
40 TABLET, DELAYED RELEASE ORAL
Status: DISCONTINUED | OUTPATIENT
Start: 2025-04-15 | End: 2025-04-15 | Stop reason: HOSPADM

## 2025-04-14 RX ORDER — NICOTINE POLACRILEX 4 MG
15 LOZENGE BUCCAL
Status: DISCONTINUED | OUTPATIENT
Start: 2025-04-14 | End: 2025-04-15 | Stop reason: HOSPADM

## 2025-04-14 RX ORDER — PROCHLORPERAZINE EDISYLATE 5 MG/ML
10 INJECTION INTRAMUSCULAR; INTRAVENOUS ONCE
Status: COMPLETED | OUTPATIENT
Start: 2025-04-14 | End: 2025-04-14

## 2025-04-14 RX ORDER — METOCLOPRAMIDE HYDROCHLORIDE 5 MG/ML
10 INJECTION INTRAMUSCULAR; INTRAVENOUS ONCE
Status: COMPLETED | OUTPATIENT
Start: 2025-04-14 | End: 2025-04-14

## 2025-04-14 RX ORDER — SODIUM CHLORIDE 9 MG/ML
40 INJECTION, SOLUTION INTRAVENOUS AS NEEDED
Status: DISCONTINUED | OUTPATIENT
Start: 2025-04-14 | End: 2025-04-15 | Stop reason: HOSPADM

## 2025-04-14 RX ORDER — ANASTROZOLE 1 MG/1
1 TABLET ORAL DAILY
Status: DISCONTINUED | OUTPATIENT
Start: 2025-04-14 | End: 2025-04-15 | Stop reason: HOSPADM

## 2025-04-14 RX ORDER — ALBUTEROL SULFATE 0.83 MG/ML
2.5 SOLUTION RESPIRATORY (INHALATION) EVERY 6 HOURS PRN
Status: DISCONTINUED | OUTPATIENT
Start: 2025-04-14 | End: 2025-04-15 | Stop reason: HOSPADM

## 2025-04-14 RX ORDER — CETIRIZINE HYDROCHLORIDE 10 MG/1
10 TABLET ORAL NIGHTLY
Status: DISCONTINUED | OUTPATIENT
Start: 2025-04-14 | End: 2025-04-15 | Stop reason: HOSPADM

## 2025-04-14 RX ORDER — INSULIN LISPRO 100 [IU]/ML
2-7 INJECTION, SOLUTION INTRAVENOUS; SUBCUTANEOUS
Status: DISCONTINUED | OUTPATIENT
Start: 2025-04-14 | End: 2025-04-15 | Stop reason: HOSPADM

## 2025-04-14 RX ORDER — ASCORBIC ACID 500 MG
500 TABLET ORAL DAILY
Status: DISCONTINUED | OUTPATIENT
Start: 2025-04-14 | End: 2025-04-15 | Stop reason: HOSPADM

## 2025-04-14 RX ADMIN — MONTELUKAST 10 MG: 10 TABLET, FILM COATED ORAL at 20:48

## 2025-04-14 RX ADMIN — PRAVASTATIN SODIUM 40 MG: 40 TABLET ORAL at 20:51

## 2025-04-14 RX ADMIN — SODIUM CHLORIDE 1000 ML: 9 INJECTION, SOLUTION INTRAVENOUS at 13:36

## 2025-04-14 RX ADMIN — ANASTROZOLE 1 MG: 1 TABLET, FILM COATED ORAL at 21:00

## 2025-04-14 RX ADMIN — CEFTRIAXONE SODIUM 1000 MG: 1 INJECTION, POWDER, FOR SOLUTION INTRAMUSCULAR; INTRAVENOUS at 13:36

## 2025-04-14 RX ADMIN — MORPHINE SULFATE 4 MG: 4 INJECTION, SOLUTION INTRAMUSCULAR; INTRAVENOUS at 11:02

## 2025-04-14 RX ADMIN — SODIUM CHLORIDE 1000 ML: 9 INJECTION, SOLUTION INTRAVENOUS at 11:01

## 2025-04-14 RX ADMIN — Medication 1 TABLET: at 20:48

## 2025-04-14 RX ADMIN — OXYBUTYNIN CHLORIDE 5 MG: 5 TABLET, EXTENDED RELEASE ORAL at 21:00

## 2025-04-14 RX ADMIN — CETIRIZINE HYDROCHLORIDE 10 MG: 10 TABLET, FILM COATED ORAL at 20:48

## 2025-04-14 RX ADMIN — DIPHENHYDRAMINE HYDROCHLORIDE 25 MG: 50 INJECTION, SOLUTION INTRAMUSCULAR; INTRAVENOUS at 16:33

## 2025-04-14 RX ADMIN — OXYCODONE HYDROCHLORIDE AND ACETAMINOPHEN 500 MG: 500 TABLET ORAL at 20:47

## 2025-04-14 RX ADMIN — IOPAMIDOL 85 ML: 755 INJECTION, SOLUTION INTRAVENOUS at 11:51

## 2025-04-14 RX ADMIN — PANTOPRAZOLE SODIUM 40 MG: 40 INJECTION, POWDER, FOR SOLUTION INTRAVENOUS at 11:02

## 2025-04-14 RX ADMIN — LOSARTAN POTASSIUM 50 MG: 50 TABLET, FILM COATED ORAL at 20:47

## 2025-04-14 RX ADMIN — LEVOTHYROXINE SODIUM 88 MCG: 88 TABLET ORAL at 20:46

## 2025-04-14 RX ADMIN — SCOPOLAMINE 1 PATCH: 1.5 PATCH, EXTENDED RELEASE TRANSDERMAL at 16:33

## 2025-04-14 RX ADMIN — SODIUM CHLORIDE, POTASSIUM CHLORIDE, SODIUM LACTATE AND CALCIUM CHLORIDE 125 ML/HR: 600; 310; 30; 20 INJECTION, SOLUTION INTRAVENOUS at 16:22

## 2025-04-14 RX ADMIN — MORPHINE SULFATE 4 MG: 4 INJECTION, SOLUTION INTRAMUSCULAR; INTRAVENOUS at 12:42

## 2025-04-14 RX ADMIN — BUDESONIDE 0.5 MG: 0.5 SUSPENSION RESPIRATORY (INHALATION) at 19:18

## 2025-04-14 RX ADMIN — ONDANSETRON 4 MG: 2 INJECTION INTRAMUSCULAR; INTRAVENOUS at 11:02

## 2025-04-14 RX ADMIN — Medication 10 ML: at 20:52

## 2025-04-14 RX ADMIN — PROCHLORPERAZINE EDISYLATE 10 MG: 5 INJECTION, SOLUTION INTRAMUSCULAR; INTRAVENOUS at 16:33

## 2025-04-14 RX ADMIN — ONDANSETRON 4 MG: 2 INJECTION, SOLUTION INTRAMUSCULAR; INTRAVENOUS at 12:42

## 2025-04-14 RX ADMIN — METOCLOPRAMIDE 10 MG: 5 INJECTION, SOLUTION INTRAMUSCULAR; INTRAVENOUS at 14:45

## 2025-04-14 RX ADMIN — EMPAGLIFLOZIN 25 MG: 10 TABLET, FILM COATED ORAL at 21:00

## 2025-04-14 RX ADMIN — GABAPENTIN 1200 MG: 400 CAPSULE ORAL at 20:44

## 2025-04-14 RX ADMIN — SERTRALINE HYDROCHLORIDE 25 MG: 50 TABLET, FILM COATED ORAL at 20:43

## 2025-04-14 RX ADMIN — DICYCLOMINE HYDROCHLORIDE 10 MG: 10 CAPSULE ORAL at 20:48

## 2025-04-14 RX ADMIN — NIFEDIPINE 30 MG: 30 TABLET, EXTENDED RELEASE ORAL at 20:59

## 2025-04-14 RX ADMIN — ARFORMOTEROL TARTRATE 15 MCG: 15 SOLUTION RESPIRATORY (INHALATION) at 19:17

## 2025-04-14 NOTE — ED NOTES
Patient and spouse given instructions and education on continuation of care. Patient is being taken by private vehicle to [facility] and being escorted by . Patient is stable at time of leaving ED. Patient was taken t vehicle via wheelchair. Patients IV was wrapped and capped

## 2025-04-14 NOTE — PLAN OF CARE
Goal Outcome Evaluation:      Pt came to Observation Unit from [facility] ED with nausea and vomiting that has persisted since Friday. Benadryl and compazine given and no vomiting since. Pt reports periumbilical cramping, nausea, decreased appetite and weakness. She is visibly lethargic. CT abdomen obtained. Afebrile. Pt is aware of need for stool sample.    Pt has refused oral medication due to nausea and abdominal discomfort.     LR infusing at 125 mL/hr. Scopolamine patch applied behind left ear.     Vitals stable on room air. Normal sinus rhythm on telemetry monitor. Alert and oriented x4.     Care ongoing

## 2025-04-14 NOTE — DISCHARGE INSTRUCTIONS
Return to the emergency department for symptoms recurrence or exacerbation or if you develop blood in your stool or vomit  Follow-up with your PCP in 1 week  Advance diet as tolerated

## 2025-04-14 NOTE — FSED PROVIDER NOTE
Subjective   History of Present Illness  65yo female pmh significant htn/dm2/hypothyroid/breast ca/cholecystectomy/hysterectomy presents ED c/o 4d hx nausea/vomiting (Friday) with subsequent periumbilical abdominal pain unable to characterize/nonradiating/associated anorexia, nausea/neg exac or relieve factors.  ROS neg fever/chest pain/soa/dysuria/hematuria/melena/hematochoezia/hematemesis/syncope.  Pt seen urgent care earlier today for same; referred ED for further evaluation.    History provided by:  Patient and spouse  Abdominal Pain  Associated symptoms: nausea and vomiting    Associated symptoms: no constipation and no diarrhea        Review of Systems   Constitutional:  Positive for appetite change.   HENT: Negative.     Eyes: Negative.    Respiratory: Negative.     Cardiovascular: Negative.    Gastrointestinal:  Positive for abdominal pain, nausea and vomiting. Negative for blood in stool, constipation and diarrhea.   Genitourinary: Negative.    Musculoskeletal: Negative.    Neurological: Negative.    All other systems reviewed and are negative.      Past Medical History:   Diagnosis Date    Arthritis     Asthma     Bulging of intervertebral disc between L4 and L5     GERD (gastroesophageal reflux disease)     High risk medication use     DRUG THERPAY FINDING    History of transfusion     Hyperlipidemia     Hypertension     Hypothyroidism     IBS (irritable bowel syndrome)     Iron deficiency anemia     Leukocytosis     Low back pain     Malignant neoplasm of female breast     Sleep apnea     Type 2 diabetes mellitus     Vitamin D deficiency        Allergies   Allergen Reactions    Liraglutide Nausea And Vomiting     Pt not sure if shes allergic to this or not.       Past Surgical History:   Procedure Laterality Date    BREAST SURGERY  2018    CHOLECYSTECTOMY  2011    COLONOSCOPY N/A 02/22/2023    Procedure: COLONOSCOPY;  Surgeon: Alexander Delcid MD;  Location: [facility] [place] [facility] MAIN OR;  Service:  Gastroenterology;  Laterality: N/A;  Diverticulosis, Ascending Polyp    HYSTERECTOMY      STEREOTACTIC BRAIN BIOPSY      SUBTOTAL HYSTERECTOMY      [facility] SURGERY Right     [facility] [place]       Family History   Problem Relation Age of Onset    Breast cancer Mother     Cancer Mother         Breast    Stroke Father     Heart disease Father     Alcohol abuse Father     Diabetes Father     Hyperlipidemia Father     Hypertension Father     Pancreatic cancer Sister     Cancer Sister         Pancreatitis    Anxiety disorder Sister     COPD Sister     Depression Sister     Diabetes Sister     Hyperlipidemia Sister     Hypertension Sister     Miscarriages / Stillbirths Sister     Anxiety disorder Sister     Depression Sister     Diabetes Sister     Hyperlipidemia Sister     Hypertension Sister        Social History     Socioeconomic History    Marital status:    Tobacco Use    Smoking status: Former     Current packs/day: 0.00     Average packs/day: 1 pack/day for 7.0 years (7.0 ttl pk-yrs)     Types: Cigarettes     Start date: 1979     Quit date:      Years since quittin.3     Passive exposure: Past    Smokeless tobacco: Never   Vaping Use    Vaping status: Never Used   Substance and Sexual Activity    Alcohol use: Not Currently     Comment: DO NOT DRINK ANYMORE    Drug use: Never    Sexual activity: Not Currently     Partners: Male           Objective   Physical Exam  Vitals and nursing note reviewed.   Constitutional:       General: She is not in acute distress.     Appearance: She is ill-appearing.   HENT:      Head: Normocephalic and atraumatic.      Right Ear: External ear normal.      Left Ear: External ear normal.      Nose: Nose normal.      Mouth/Throat:      Mouth: Mucous membranes are moist.      Pharynx: Oropharynx is clear. No oropharyngeal exudate or posterior oropharyngeal erythema.   Eyes:      Pupils: Pupils are equal, round, and reactive to light.   Cardiovascular:      Rate and  Rhythm: Normal rate and regular rhythm.      Pulses: Normal pulses.      Heart sounds: Normal heart sounds. No murmur heard.     No friction rub. No gallop.   Pulmonary:      Effort: Pulmonary effort is normal. No respiratory distress.      Breath sounds: Normal breath sounds. No wheezing, rhonchi or rales.   Abdominal:      General: Abdomen is flat. Bowel sounds are normal.      Palpations: Abdomen is soft.      Tenderness:  in the right lower quadrant, periumbilical area and left lower quadrant There is no guarding or rebound. Negative signs include McBurney's sign.      Hernia: There is no hernia in the umbilical area or ventral area.       Musculoskeletal:         General: No swelling or deformity.      Cervical back: Normal range of motion and neck supple. No rigidity.      Right lower leg: No edema.      Left lower leg: No edema.   Lymphadenopathy:      Cervical: No cervical adenopathy.   Skin:     General: Skin is warm and dry.   Neurological:      General: No focal deficit present.      Mental Status: She is alert and oriented to person, place, and time.      GCS: GCS eye subscore is 4. GCS verbal subscore is 5. GCS motor subscore is 6.         ECG 12 Lead      Date/Time: 4/14/2025 11:15 AM    Performed by: Moncho Scruggs MD  Authorized by: Moncho Scruggs MD  Interpreted by ED physician  Rhythm: sinus rhythm  Rate: normal  BPM: 72  QRS axis: normal  Conduction: conduction normal  T flattening: V2 and V3  Other findings: PRWP  Clinical impression: non-specific ECG               ED Course  ED Course as of 04/14/25 1447   Mon Apr 14, 2025   1441 Patient with intractable vomiting. Plan admit 23 observation. [SD]   1444 D/w [facility] [place] operated by [facility] ED observation unit APRN accepting admit for Dr. Mckeon.  Pt stable transport. [SD]      ED Course User Index  [SD] Moncho Scruggs MD      Labs Reviewed   COMPREHENSIVE METABOLIC PANEL - Abnormal; Notable for the following components:       Result Value    Glucose 133 (*)      Creatinine 0.52 (*)     Sodium 135 (*)     Chloride 97 (*)     Alkaline Phosphatase 131 (*)     All other components within normal limits    Narrative:     GFR Categories in Chronic Kidney Disease (CKD)      GFR Category          GFR (mL/min/1.73)    Interpretation  G1                     90 or greater         Normal or high (1)  G2                      60-89                Mild decrease (1)  G3a                   45-59                Mild to moderate decrease  G3b                   30-44                Moderate to severe decrease  G4                    15-29                Severe decrease  G5                    14 or less           Kidney failure          (1)In the absence of evidence of kidney disease, neither GFR category G1 or G2 fulfill the criteria for CKD.    eGFR calculation 2021 CKD-EPI creatinine equation, which does not include race as a factor   URINALYSIS W/ MICROSCOPIC IF INDICATED (NO CULTURE) - Abnormal; Notable for the following components:    Appearance, UA Slightly Cloudy (*)     Glucose,  mg/dL (2+) (*)     Ketones, UA >=160 mg/dL (4+) (*)     Nitrite, UA Positive (*)     All other components within normal limits   LIPASE - Normal   LACTIC ACID, PLASMA - Normal   CBC WITH AUTO DIFFERENTIAL - Normal   TROPONIN - Normal    Narrative:     High Sensitive Troponin T Reference Range:  <14.0 ng/L- Negative Female for AMI  <22.0 ng/L- Negative Male for AMI  >=14 - Abnormal Female indicating possible myocardial injury.  >=22 - Abnormal Male indicating possible myocardial injury.   Clinicians would have to utilize clinical acumen, EKG, Troponin, and serial changes to determine if it is an Acute Myocardial Infarction or myocardial injury due to an underlying chronic condition.        HIGH SENSITIVITIY TROPONIN T 1HR    Narrative:     High Sensitive Troponin T Reference Range:  <14.0 ng/L- Negative Female for AMI  <22.0 ng/L- Negative Male for AMI  >=14 - Abnormal Female indicating possible myocardial  injury.  >=22 - Abnormal Male indicating possible myocardial injury.   Clinicians would have to utilize clinical acumen, EKG, Troponin, and serial changes to determine if it is an Acute Myocardial Infarction or myocardial injury due to an underlying chronic condition.        URINALYSIS, MICROSCOPIC ONLY   RAINBOW URINE CULTURE TUBE   CBC AND DIFFERENTIAL    Narrative:     The following orders were created for panel order CBC & Differential.  Procedure                               Abnormality         Status                     ---------                               -----------         ------                     CBC Auto Differential[[id number]]        Normal              Final result                 Please view results for these tests on the individual orders.     CT Angiogram Abdomen Pelvis  Result Date: 4/14/2025  Narrative: CT ANGIOGRAM ABDOMEN AND PELVIS WITH IV CONTRAST  HISTORY: Abdominal pain. Nausea and vomiting.  TECHNIQUE: CT angiogram abdomen and pelvis with IV contrast in the arterial phase of contrast. Data reconstructed in coronal and sagittal planes and 3D volume rendering performed.  COMPARISON: None.  FINDINGS: Distal thoracic aorta appears within normal limits. Mild narrowing of the celiac artery origin. Superior mesenteric artery, both main renal arteries, accessory left lower pole renal artery, inferior mesenteric artery are patent. No abdominal aortic aneurysm. Mild calcified plaque origin of left common iliac artery. The common iliac arteries, internal/external iliac arteries, common femoral arteries, proximal superficial and deep femoral arteries are patent.  Lung bases are clear. Liver, adrenal glands, pancreas, spleen appear within normal limits. There is mild right mid to upper pole renal cortical thinning and scarring. No hydronephrosis. Left kidney is normal. Urinary bladder appears within the limits.  There are fluid-filled mildly distended small bowel loops within the central and right  pelvis measuring up to 3.2 cm. More proximal bowel loops are not dilated. No colonic distention or evidence for colitis. No julia enlargement is demonstrated in the abdomen or pelvis. No evidence for appendicitis. No ascites.      Impression: 1. No evidence for significant stenosis, dissection, or aneurysm. 2. Fluid distended small bowel within the central to right pelvis. This is nonspecific and may be associated with mild enteritis. No abnormal wall thickening is present. There is no upstream dilatation to suggest obstruction though follow-up CT can be obtained if patient's symptoms persist or worsen.  Radiation dose reduction techniques were utilized, including automated exposure control and exposure modulation based on body size.   This report was finalized on 4/14/2025 2:20 PM by Raimundo Duran M.D on Workstation: [id number]      XR Chest 1 View  Result Date: 4/14/2025  Narrative: XR CHEST 1 VW-  Clinical: Abdominal pain  FINDINGS: Heart size within normal limits, no mediastinal or hilar abnormality is demonstrated and the lungs are clear.  Occlusion: No active disease of the chest  This report was finalized on 4/14/2025 11:39 AM by Dr. Abisai Storey M.D on Workstation: [id number]                                           Medical Decision Making  Labs/radiographic findings reviewed.  CBC/CMP/lipase/lactate: normal.  UA: nitrite(+)/ketonuria.  CXR: no active disease.  CT abd/pelvis: mild fluid filled small bowel loops/negative bowel obstruction/no evidence colitis, appendicitis.  hsTnT: neg x2.  EKG: SR/rate 72/normal axis/prwp/NSSTTW changes.  No evidence peritonitis/sepsis/sirs/hepatitis/pancreatitis/pneumonia/mesenteric ischemia/diverticulitis.  Pt received 2L NS bolus/rocephin 1g iv.  Plan discharge home with cipro 500mg bid x7d/prn bentyl 20mg qid/prn zofran 4mg odt.  Pmd/gi followup. Strict return precautions.    Problems Addressed:  Dehydration: complicated acute illness or injury  Enteritis:  complicated acute illness or injury  Nausea and vomiting, unspecified vomiting type: complicated acute illness or injury  UTI (urinary tract infection), bacterial: complicated acute illness or injury    Amount and/or Complexity of Data Reviewed  Labs: ordered.  Radiology: ordered.  ECG/medicine tests: ordered and independent interpretation performed.    Risk  Prescription drug management.  Decision regarding hospitalization.        Final diagnoses:   Enteritis   Nausea and vomiting, unspecified vomiting type   UTI (urinary tract infection), bacterial   Dehydration       ED Disposition  ED Disposition       ED Disposition   Decision to Admit    Condition   --    Comment   --               Lopez Partida MD  [address] [facility] [address]  [phone]    In 1 day      Rolando Rodriguez MD  [address]  [address]  [facility] [id number] [phone]    In 3 days           Medication List        New Prescriptions      ciprofloxacin 500 MG tablet  Commonly known as: CIPRO  Take 1 tablet by mouth 2 (Two) Times a Day for 7 days.     ondansetron ODT 4 MG disintegrating tablet  Commonly known as: ZOFRAN-ODT  Place 1 tablet on the tongue Every 8 (Eight) Hours As Needed for Nausea or Vomiting.            Changed      * dicyclomine 10 MG capsule  Commonly known as: BENTYL  What changed: Another medication with the same name was added. Make sure you understand how and when to take each.     * dicyclomine 20 MG tablet  Commonly known as: BENTYL  Take 1 tablet by mouth Every 6 (Six) Hours As Needed for Abdominal Cramping.  What changed: You were already taking a medication with the same name, and this prescription was added. Make sure you understand how and when to take each.           * This list has 2 medication(s) that are the same as other medications prescribed for you. Read the directions carefully, and ask your doctor or other care provider to review them with you.                   Where to Get Your Medications         These medications were sent to [id number] - [place], [place] - [address] - [phone]  - [phone]  [address], [facility] [address]      Phone: [phone]   ciprofloxacin 500 MG tablet  dicyclomine 20 MG tablet  ondansetron ODT 4 MG disintegrating tablet

## 2025-04-14 NOTE — H&P
[facility]   HISTORY AND PHYSICAL    Patient Name: Sigrid Holder  : 1960  MRN: [id number]  Primary Care Physician:  Lopez Partida MD  Date of admission: 2025    Subjective   Subjective     Chief Complaint:   Chief Complaint   Patient presents with    Vomiting    Abdominal Pain         HPI:    Sigrid Holder is a pleasant afebrile 64 y.o.  female with a past medical history of breast cancer, type 2 diabetes, hypertension, gastroesophageal reflux disease, hypertension and hyperlipidemia.    She presents to the emergency department at [facility] today with complaint of nausea and vomiting.  She has been admitted to the ED observation unit for further testing and evaluation.    Patient describes intermittent yellow/green emesis and abdominal cramping since Thursday.  She denies any sick contacts.  No recent antibiotic usage.  Last bowel movement last night was soft and nonbloody.    States that she received Zofran at [facility] ED which seemed to make her nausea feel worse.  She received a dose of IV Rocephin there for presumed urinary tract infection.  She denies any dysuria, urinary frequency, fever or chills.    Review of Systems   All systems were reviewed and negative except for: What is mentioned above    Personal History     Past Medical History:   Diagnosis Date    Arthritis     Asthma     Bulging of intervertebral disc between L4 and L5     GERD (gastroesophageal reflux disease)     High risk medication use     DRUG THERPAY FINDING    History of transfusion     Hyperlipidemia     Hypertension     Hypothyroidism     IBS (irritable bowel syndrome)     Iron deficiency anemia     Leukocytosis     Low back pain     Malignant neoplasm of female breast     Sleep apnea     Type 2 diabetes mellitus     Vitamin D deficiency        Past Surgical History:   Procedure Laterality Date    BREAST SURGERY  2018    CHOLECYSTECTOMY  2011    COLONOSCOPY N/A 2023     Procedure: COLONOSCOPY;  Surgeon: Alexander Delcid MD;  Location: [facility] MAIN OR;  Service: Gastroenterology;  Laterality: N/A;  Diverticulosis, Ascending Polyp    HYSTERECTOMY      STEREOTACTIC BRAIN BIOPSY      SUBTOTAL HYSTERECTOMY      [facility]ST SURGERY Right [facility] [place]       Family History: family history includes Alcohol abuse in her father; Anxiety disorder in her sister and sister; Breast cancer in her mother; COPD in her sister; Cancer in her mother and sister; Depression in her sister and sister; Diabetes in her father, sister, and sister; Heart disease in her father; Hyperlipidemia in her father, sister, and sister; Hypertension in her father, sister, and sister; Miscarriages / Stillbirths in her sister; Pancreatic cancer in her sister; Stroke in her father. Otherwise pertinent FHx was reviewed and not pertinent to current issue.    Social History:  reports that she quit smoking about 39 years ago. Her smoking use included cigarettes. She started smoking about 46 years ago. She has a 7 pack-year smoking history. She has been exposed to tobacco smoke. She has never used smokeless tobacco. She reports that she does not currently use alcohol. She reports that she does not use drugs.    Home Medications:  Aspirin Buf(CaCarb-MgCarb-MgO), Ciclesonide, Docusate Sodium, Fluticasone-Salmeterol, FreeStyle Wilbur 2 Sensor, LORazepam, Misc Natural Products, NIFEdipine CC, Semaglutide(0.25 or 0.5MG/DOS), Xylitol, albuterol sulfate HFA, anastrozole, ascorbic acid, calcium carb-cholecalciferol, cetirizine, ciprofloxacin, desloratadine, dexlansoprazole, dicyclomine, empagliflozin, exenatide er, famotidine, gabapentin, icosapent ethyl, insulin glargine, levothyroxine, metFORMIN, montelukast, multivitamin with minerals, ondansetron ODT, oxybutynin XL, pantoprazole, pravastatin, sertraline, telmisartan, and valACYclovir    Allergies:  Allergies   Allergen Reactions    Liraglutide Nausea And Vomiting      Pt not sure if shes allergic to this or not.       Objective   Objective     Vitals:   Temp:  [97.3 °F (36.3 °C)-97.9 °F (36.6 °C)] 97.9 °F (36.6 °C)  Heart Rate:  [73-83] 82  Resp:  [16-18] 16  BP: (130-154)/(59-81) 130/59  Physical Exam    Constitutional: Awake, alert, uncomfortable appearing   Eyes: PERRLA, sclerae anicteric, no conjunctival injection   HENT: NCAT, mucous membranes moist   Neck: Supple, no thyromegaly, no lymphadenopathy, trachea midline   Respiratory: Clear to auscultation bilaterally, nonlabored respirations    Cardiovascular: RRR, no murmurs, rubs, or gallops, palpable pedal pulses bilaterally   Gastrointestinal: Positive bowel sounds, soft, nontender, nondistended   Musculoskeletal: No bilateral ankle edema, no clubbing or cyanosis to extremities   Psychiatric: Appropriate affect, cooperative   Neurologic: Oriented x 3, strength symmetric in all extremities, Cranial Nerves grossly intact to confrontation, speech clear   Skin: No rashes     Result Review    Result Review:  I have personally reviewed the results from the time of this admission to 4/14/2025 16:38 EDT and agree with these findings:  [x]  Laboratory list / accordion  []  Microbiology  [x]  Radiology  []  EKG/Telemetry   []  Cardiology/Vascular   []  Pathology  []  Old records  []  Other:  Most notable findings include: CTA chest abdomen and pelvis shows fluid distended small bowel suggestive of enteritis without evidence of obstruction      Assessment & Plan   The ASCVD Risk score ([place] DK, et al., 2019) failed to calculate for the following reasons:    The valid total cholesterol range is 130 to 320 mg/dL    Assessment / Plan     Brief Patient Summary:  Sigrid Holder is a 64 y.o. female who is being evaluated for persistent nausea since Thursday and CT findings suggestive of enteritis    Active Hospital Problems:  Active Hospital Problems    Diagnosis     **Nausea with vomiting      Plan:     Nausea with vomiting  CTA  abdomen pelvis suggestive of enteritis  Consulted GI  Compazine for nausea  Clear liquid diet, n.p.o. after midnight  Send GI panel if patient happens to have loose stools  2 L normal saline bolus given in ED, LR at 125 mL/h    Hypertension  Vital signs every 4 hours  Continue home medications    Type 2 diabetes with hyperglycemia  Check A1c  Hold metformin for 48 hours following administration of CT contrast  Continue Jardiance  Low-dose correctional sliding scale insulin protocol initiated    VTE Prophylaxis:  Mechanical VTE prophylaxis orders are present.        CODE STATUS:    Code Status (Patient has no pulse and is not breathing): CPR (Attempt to Resuscitate)  Medical Interventions (Patient has pulse or is breathing): Full Support  Level Of Support Discussed With: Patient    Admission Status:  I believe this patient meets observation status.    Electronically signed by REGINA Mayberry, 04/14/25, 4:22 PM EDT.      80 minutes has been spent by [facility] providers in the care of this patient while under observation status on this date 04/14/25        I have worn appropriate PPE during this patient encounter, sanitized my hands both with entering and exiting patient's room.    I have discussed plan of care with patient including advance care plan and/or surrogate decision maker.  Patient advises that their  Pan will be their primary surrogate decision maker

## 2025-04-15 ENCOUNTER — READMISSION MANAGEMENT (OUTPATIENT)
Dept: CALL CENTER | Facility: HOSPITAL | Age: 65
End: 2025-04-15
Payer: OTHER GOVERNMENT

## 2025-04-15 VITALS
WEIGHT: 132 LBS | BODY MASS INDEX: 23.39 KG/M2 | OXYGEN SATURATION: 97 % | TEMPERATURE: 97.6 F | HEART RATE: 74 BPM | SYSTOLIC BLOOD PRESSURE: 142 MMHG | DIASTOLIC BLOOD PRESSURE: 69 MMHG | HEIGHT: 63 IN | RESPIRATION RATE: 18 BRPM

## 2025-04-15 LAB
ANION GAP SERPL CALCULATED.3IONS-SCNC: 13.2 MMOL/L (ref 5–15)
BUN SERPL-MCNC: 5 MG/DL (ref 8–23)
BUN/CREAT SERPL: 10.6 (ref 7–25)
CALCIUM SPEC-SCNC: 8.3 MG/DL (ref 8.6–10.5)
CHLORIDE SERPL-SCNC: 104 MMOL/L (ref 98–107)
CO2 SERPL-SCNC: 21.8 MMOL/L (ref 22–29)
CREAT SERPL-MCNC: 0.47 MG/DL (ref 0.57–1)
DEPRECATED RDW RBC AUTO: 41.8 FL (ref 37–54)
EGFRCR SERPLBLD CKD-EPI 2021: 107.1 ML/MIN/1.73
ERYTHROCYTE [DISTWIDTH] IN BLOOD BY AUTOMATED COUNT: 13.4 % (ref 12.3–15.4)
GLUCOSE BLDC GLUCOMTR-MCNC: 100 MG/DL (ref 70–130)
GLUCOSE BLDC GLUCOMTR-MCNC: 102 MG/DL (ref 70–130)
GLUCOSE SERPL-MCNC: 94 MG/DL (ref 65–99)
HCT VFR BLD AUTO: 39.8 % (ref 34–46.6)
HGB BLD-MCNC: 13 G/DL (ref 12–15.9)
MCH RBC QN AUTO: 28.4 PG (ref 26.6–33)
MCHC RBC AUTO-ENTMCNC: 32.7 G/DL (ref 31.5–35.7)
MCV RBC AUTO: 86.9 FL (ref 79–97)
PLATELET # BLD AUTO: 263 10*3/MM3 (ref 140–450)
PMV BLD AUTO: 8.8 FL (ref 6–12)
POTASSIUM SERPL-SCNC: 3.6 MMOL/L (ref 3.5–5.2)
QT INTERVAL: 390 MS
QTC INTERVAL: 427 MS
RBC # BLD AUTO: 4.58 10*6/MM3 (ref 3.77–5.28)
SODIUM SERPL-SCNC: 139 MMOL/L (ref 136–145)
WBC NRBC COR # BLD AUTO: 8.26 10*3/MM3 (ref 3.4–10.8)

## 2025-04-15 PROCEDURE — 99214 OFFICE O/P EST MOD 30 MIN: CPT | Performed by: PHYSICIAN ASSISTANT

## 2025-04-15 PROCEDURE — 94799 UNLISTED PULMONARY SVC/PX: CPT

## 2025-04-15 PROCEDURE — 80048 BASIC METABOLIC PNL TOTAL CA: CPT | Performed by: NURSE PRACTITIONER

## 2025-04-15 PROCEDURE — 94664 DEMO&/EVAL PT USE INHALER: CPT

## 2025-04-15 PROCEDURE — 94761 N-INVAS EAR/PLS OXIMETRY MLT: CPT

## 2025-04-15 PROCEDURE — 97530 THERAPEUTIC ACTIVITIES: CPT

## 2025-04-15 PROCEDURE — 82948 REAGENT STRIP/BLOOD GLUCOSE: CPT

## 2025-04-15 PROCEDURE — 25810000003 LACTATED RINGERS PER 1000 ML: Performed by: NURSE PRACTITIONER

## 2025-04-15 PROCEDURE — 97161 PT EVAL LOW COMPLEX 20 MIN: CPT

## 2025-04-15 PROCEDURE — 85027 COMPLETE CBC AUTOMATED: CPT | Performed by: NURSE PRACTITIONER

## 2025-04-15 PROCEDURE — G0378 HOSPITAL OBSERVATION PER HR: HCPCS

## 2025-04-15 RX ADMIN — ARFORMOTEROL TARTRATE 15 MCG: 15 SOLUTION RESPIRATORY (INHALATION) at 07:47

## 2025-04-15 RX ADMIN — OXYBUTYNIN CHLORIDE 5 MG: 5 TABLET, EXTENDED RELEASE ORAL at 09:39

## 2025-04-15 RX ADMIN — ANASTROZOLE 1 MG: 1 TABLET, FILM COATED ORAL at 09:47

## 2025-04-15 RX ADMIN — NIFEDIPINE 30 MG: 30 TABLET, EXTENDED RELEASE ORAL at 09:39

## 2025-04-15 RX ADMIN — LEVOTHYROXINE SODIUM 88 MCG: 88 TABLET ORAL at 09:40

## 2025-04-15 RX ADMIN — BUDESONIDE 0.5 MG: 0.5 SUSPENSION RESPIRATORY (INHALATION) at 07:50

## 2025-04-15 RX ADMIN — GABAPENTIN 1200 MG: 400 CAPSULE ORAL at 09:39

## 2025-04-15 RX ADMIN — DICYCLOMINE HYDROCHLORIDE 10 MG: 10 CAPSULE ORAL at 13:38

## 2025-04-15 RX ADMIN — Medication 10 ML: at 09:46

## 2025-04-15 RX ADMIN — PANTOPRAZOLE SODIUM 40 MG: 40 TABLET, DELAYED RELEASE ORAL at 05:30

## 2025-04-15 RX ADMIN — SODIUM CHLORIDE, POTASSIUM CHLORIDE, SODIUM LACTATE AND CALCIUM CHLORIDE 125 ML/HR: 600; 310; 30; 20 INJECTION, SOLUTION INTRAVENOUS at 00:40

## 2025-04-15 RX ADMIN — LOSARTAN POTASSIUM 50 MG: 50 TABLET, FILM COATED ORAL at 09:39

## 2025-04-15 RX ADMIN — DICYCLOMINE HYDROCHLORIDE 10 MG: 10 CAPSULE ORAL at 09:39

## 2025-04-15 RX ADMIN — SERTRALINE HYDROCHLORIDE 25 MG: 50 TABLET, FILM COATED ORAL at 09:40

## 2025-04-15 NOTE — PLAN OF CARE
Goal Outcome Evaluation:  Plan of Care Reviewed With: patient           Outcome Evaluation: Patient is a 64 y.o female who presented to [facility] with nausea and vomiting. Patients spouse at bedside reports patient had trouble getting OOB yesterday due to weakness but reports patient doing much better today. Patient is independent at baseline with no AD. Patient completed all bed mobility with SBA. Patient stood and ambulated 150ft around unit with no AD and SBA. Gait slow and gaurded but steady with no overt LOB noted. Encouraged patient to continue ambulating in hallway several times per day to maintain activity endurance. Acute PT will sign off.    Anticipated Discharge Disposition (PT): home with assist

## 2025-04-15 NOTE — CONSULTS
[facility]  Initial Inpatient Consult Note    Referring Provider: MD Lexi    Reason for Consultation: Abdominal pain with nausea and vomiting    Subjective     History of present illness:    64 y.o. female with past medical history of breast cancer, type 2 diabetes mellitus, hypertension, GERD, hyperlipidemia who we are being asked to see for evaluation of abdominal pain with nausea and vomiting.    Patient underwent CTA of the abdomen and pelvis on 4/14/2025 with fluid distended small bowel within the central to right pelvis which is nonspecific and may represent mild enteritis, no abnormal wall thickening.    Patient reports starting around Friday or Saturday began experiencing nausea and vomiting.  She does have generalized abdominal pain which she describes as cramping.  She does continue to endorse nausea but vomiting is improved with antiemetic therapy.  She reports bowel movements were relatively normal up until the last couple of days she did have some loose stools.  No black or bloody stools.  She denies any new medications or sick contacts.  No new antibiotics.  No recent travel.    Colonoscopy in 2023 with Dr. Delcid.  She had 1 colon polyp.    Past Medical History:  Past Medical History:   Diagnosis Date    Arthritis     Asthma     Bulging of intervertebral disc between L4 and L5     GERD (gastroesophageal reflux disease)     High risk medication use     DRUG THERPAY FINDING    History of transfusion     Hyperlipidemia     Hypertension     Hypothyroidism     IBS (irritable bowel syndrome)     Iron deficiency anemia     Leukocytosis     Low back pain     Malignant neoplasm of female breast     Sleep apnea     Type 2 diabetes mellitus     Vitamin D deficiency      Past Surgical History:  Past Surgical History:   Procedure Laterality Date    BREAST SURGERY  2018    CHOLECYSTECTOMY  2011    COLONOSCOPY N/A 02/22/2023    Procedure: COLONOSCOPY;  Surgeon: Alexander Delcid  MD Isreal;  [place] [facility] [place]  Service: Gastroenterology;  Laterality: N/A;  Diverticulosis, Ascending Polyp    HYSTERECTOMY      STEREOTACTIC BRAIN BIOPSY      SUBTOTAL HYSTERECTOMY      [facility]GERY [facility] [place]      Social History:   Social History     Tobacco Use    Smoking status: Former     Current packs/day: 0.00     Average packs/day: 1 pack/day for 7.0 years (7.0 ttl pk-yrs)     Types: Cigarettes     Start date: 1979     Quit date:      Years since quittin.3     Passive exposure: Past    Smokeless tobacco: Never   Substance Use Topics    Alcohol use: Not Currently     Comment: DO NOT DRINK ANYMORE      Family History:  Family History   Problem Relation Age of Onset    Breast cancer Mother     Cancer Mother         Breast    Stroke Father     Heart disease Father     Alcohol abuse Father     Diabetes Father     Hyperlipidemia Father     Hypertension Father     Pancreatic cancer Sister     Cancer Sister         Pancreatitis    Anxiety disorder Sister     COPD Sister     Depression Sister     Diabetes Sister     Hyperlipidemia Sister     Hypertension Sister     Miscarriages / Stillbirths Sister     Anxiety disorder Sister     Depression Sister     Diabetes Sister     Hyperlipidemia Sister     Hypertension Sister        Home Meds:  Facility-Administered Medications Prior to Admission   Medication Dose Route Frequency Provider Last Rate Last Admin    BREAST PROSTHESIS misc 1 Bag.  1 Bag. Not Applicable Daily Jessica Bernard APRN        BREAST PROSTHESIS misc 2 Units  2 Units Not Applicable PRN Jessica Bernard APRN         Medications Prior to Admission   Medication Sig Dispense Refill Last Dose/Taking    ADVAIR DISKUS 250-50 MCG/DOSE DISKUS    Past Week    anastrozole (ARIMIDEX) 1 MG tablet Take 1 tablet by mouth Daily. 90 tablet 3 Past Week    ascorbic acid (VITAMIN C) 1000 MG tablet    Past Week    Aspirin Buf,CaCarb-MgCarb-MgO, 81 MG tablet Take  by mouth.   Past Week     calcium carb-cholecalciferol 600-800 MG-UNIT tablet Take  by mouth.   Past Week    cetirizine (zyrTEC) 10 MG tablet    Past Week    desloratadine (CLARINEX) 5 MG tablet    Past Week    dexlansoprazole (DEXILANT) 60 MG capsule Take  by mouth Daily.   Past Week    dicyclomine (BENTYL) 10 MG capsule Take  by mouth 2 (Two) Times a Day.   Past Week    Docusate Sodium 100 MG capsule Take  by mouth Daily.   Past Week    empagliflozin (JARDIANCE) 25 MG tablet tablet Take  by mouth.   Past Week    exenatide er (BYDUREON) 2 MG pen-injector injection Inject  under the skin into the appropriate area as directed.   Past Week    gabapentin (NEURONTIN) 300 MG capsule Take 4 capsules by mouth 3 (Three) Times a Day.   Past Week    icosapent ethyl (VASCEPA) 1 g capsule capsule TAKE 2 CAPSULES (2 GRAMS) TWICE A DAY WITH MEALS 360 capsule 0 Past Week    insulin glargine (LANTUS, SEMGLEE) 100 UNIT/ML injection Inject 18 Units under the skin into the appropriate area as directed Daily.   Past Week    levothyroxine (SYNTHROID, LEVOTHROID) 88 MCG tablet Take 1 tablet by mouth Daily.   Past Week    LORazepam (Ativan) 0.5 MG tablet Take 1 tablet by mouth Every 8 (Eight) Hours As Needed for Anxiety. 30 tablet 2 Past Week    metFORMIN (GLUCOPHAGE) 850 MG tablet Take 1 tablet by mouth Every 12 (Twelve) Hours.   Past Week    montelukast (SINGULAIR) 10 MG tablet    Past Week    Multiple Vitamins-Minerals (CENTRUM SILVER 50+WOMEN PO) Take  by mouth Daily.   Past Week    NIFEdipine CC (ADALAT CC) 30 MG 24 hr tablet    Past Week    oxybutynin XL (DITROPAN-XL) 5 MG 24 hr tablet TAKE 1 TABLET DAILY 90 tablet 0 Past Week    pantoprazole (PROTONIX) 40 MG EC tablet    Past Week    pravastatin (PRAVACHOL) 40 MG tablet Take  by mouth.   Past Week    Semaglutide,0.25 or 0.5MG/DOS, (Ozempic, 0.25 or 0.5 MG/DOSE,) 2 MG/1.5ML solution pen-injector Ozempic (0.25 or 0.5 MG/DOSE)   Past Week    sertraline (Zoloft) 25 MG tablet Take 1 tablet by mouth Daily. 90  tablet 1 Past Week    telmisartan (MICARDIS) 40 MG tablet Take  by mouth Daily.   Past Week    valACYclovir (VALTREX) 500 MG tablet Take 1 tablet by mouth 2 (Two) Times a Day.   Past Week    Continuous Glucose Sensor (FreeStyle Wilbur 2 Sensor) misc CHECK BLOOD GLUCOSE AS DIRECTED AND REPLACE EVERY 14 DAYS       famotidine (PEPCID) 40 MG tablet Take 1 tablet by mouth Daily.       [facility] Natural Products (CRAMP RELEAF PO) Take  by mouth.       PROAIR  (90 Base) MCG/ACT inhaler        Xylitol 500 MG disk XyliMelts 500 MG Mouth/Throat Disk; Patient Sig: XyliMelts 500 MG Mouth/Throat Disk 1 IN THE EVENING; 0; 05-Feb-2015; Active       ZETONNA 37 MCG/ACT aerosol solution         Current Meds:   anastrozole, 1 mg, Oral, Daily  budesonide, 0.5 mg, Nebulization, BID - RT   And  arformoterol, 15 mcg, Nebulization, BID - RT  ascorbic acid, 500 mg, Oral, Daily  cetirizine, 10 mg, Oral, Nightly  dicyclomine, 10 mg, Oral, 4x Daily  empagliflozin, 25 mg, Oral, Daily  gabapentin, 1,200 mg, Oral, TID  icosapent ethyl, 2 g, Oral, BID With Meals  insulin glargine, 18 Units, Subcutaneous, Daily  insulin lispro, 2-7 Units, Subcutaneous, 4x Daily AC & at Bedtime  levothyroxine, 88 mcg, Oral, Daily  losartan, 50 mg, Oral, Q24H  montelukast, 10 mg, Oral, Nightly  multivitamin with minerals, 1 tablet, Oral, Daily  NIFEdipine XL, 30 mg, Oral, Q24H  oxybutynin XL, 5 mg, Oral, Daily  pantoprazole, 40 mg, Oral, Q AM  pravastatin, 40 mg, Oral, Nightly  Scopolamine, 1 patch, Transdermal, Q72H  sertraline, 25 mg, Oral, Daily  sodium chloride, 10 mL, Intravenous, Q12H      Allergies:  Allergies   Allergen Reactions    Liraglutide Nausea And Vomiting     Pt not sure if shes allergic to this or not.       Objective     Vital Signs  Temp:  [97.3 °F (36.3 °C)-97.9 °F (36.6 °C)] 97.8 °F (36.6 °C)  Heart Rate:  [66-87] 70  Resp:  [16-20] 20  BP: (119-154)/(45-81) 135/52  Physical Exam:  General Appearance:     Alert, cooperative, in no acute  distress   Abdomen:     Normal bowel sounds, no masses, no organomegaly, soft     nontender, nondistended, no guarding, no rebound                 tenderness   Rectal:     Deferred       Results Review:   I reviewed the patient's new clinical results.  I reviewed the patient's new imaging results and agree with the interpretation.    Results from last 7 days   Lab Units 04/15/25  0353 04/14/25  1048   WBC 10*3/mm3 8.26 8.02   HEMOGLOBIN g/dL 13.0 14.5   HEMATOCRIT % 39.8 44.6   PLATELETS 10*3/mm3 263 306     Results from last 7 days   Lab Units 04/15/25  0353 04/14/25  1048   SODIUM mmol/L 139 135*   POTASSIUM mmol/L 3.6 3.5   CHLORIDE mmol/L 104 97*   CO2 mmol/L 21.8* 26.3   BUN mg/dL 5* 12   CREATININE mg/dL 0.47* 0.52*   CALCIUM mg/dL 8.3* 9.5   BILIRUBIN mg/dL  --  0.2   ALK PHOS U/L  --  131*   ALT (SGPT) U/L  --  33   AST (SGOT) U/L  --  28   GLUCOSE mg/dL 94 133*         Lab Results   Lab Value Date/Time    LIPASE 47 04/14/2025 1048       Radiology:  CT Angiogram Abdomen Pelvis   Final Result   1. No evidence for significant stenosis, dissection, or aneurysm.   2. Fluid distended small bowel within the central to right pelvis. This   is nonspecific and may be associated with mild enteritis. No abnormal   wall thickening is present. There is no upstream dilatation to suggest   obstruction though follow-up CT can be obtained if patient's symptoms   persist or worsen.       Radiation dose reduction techniques were utilized, including automated   exposure control and exposure modulation based on body size.           This report was finalized on 4/14/2025 2:20 PM by Raimundo Duran M.D   on Workstation: [id number]          XR Chest 1 View   Final Result          Assessment & Plan   Assessment:   Abdominal pain with nausea and vomiting  CTA with evidence of enteritis    Plan:   Check GI PCR if able  Continue supportive care with as needed antiemetics and analgesia  Discussed CT findings with the patient.   Findings most consistent with enteritis which is typically self-limited.  No acute indication for EGD or colonoscopy.  GI will sign off at this time.  If stool studies are collected and are positive do not hesitate to reach out to me for further management if needed.  Follow-up with Dr. Delcid as needed.    I discussed the patients findings and my recommendations with patient.    Dictated utilizing Dragon dictation.         Shira Watkins PA-C   [facility]  [address] [place] [address]  Office: [phone]

## 2025-04-15 NOTE — THERAPY EVALUATION
Patient Name: Sigrid Holder  : 1960    MRN: [id number]                              Today's Date: 4/15/2025       Admit Date: 2025    Visit Dx:     ICD-10-CM ICD-9-CM   1. Enteritis  K52.9 558.9   2. Nausea and vomiting, unspecified vomiting type  R11.2 787.01   3. UTI (urinary tract infection), bacterial  N39.0 599.0    A49.9 041.9   4. Dehydration  E86.0 276.51     Patient Active Problem List   Diagnosis    Lobular carcinoma of left breast    BRCA2 gene mutation positive in female    Personal history of colonic polyps    Abnormal blood chemistry level    Airway hyperreactivity    Arthritis    Benign essential hypertension    High risk medication use    Type 2 diabetes mellitus    Disease of thyroid gland    Hypothyroidism    Iron deficiency anemia    Irritable bowel syndrome    Leukocytosis    Back pain, chronic    Malignant neoplasm of female breast    Mixed hyperlipidemia    Vitamin D deficiency    Adjustment disorder with mixed anxiety and depressed mood    Right foot pain    Shortness of breath    Nausea with vomiting     Past Medical History:   Diagnosis Date    Arthritis     Asthma     Bulging of intervertebral disc between L4 and L5     GERD (gastroesophageal reflux disease)     High risk medication use     DRUG THERPAY FINDING    History of transfusion     Hyperlipidemia     Hypertension     Hypothyroidism     IBS (irritable bowel syndrome)     Iron deficiency anemia     Leukocytosis     Low back pain     Malignant neoplasm of female breast     Sleep apnea     Type 2 diabetes mellitus     Vitamin D deficiency      Past Surgical History:   Procedure Laterality Date    BREAST SURGERY  2018    CHOLECYSTECTOMY      COLONOSCOPY N/A 2023    Procedure: COLONOSCOPY;  Surgeon: Alexander Delcid MD;  [place] [place];  Service: Gastroenterology;  Laterality: N/A;  Diverticulosis, Ascending Polyp    HYSTERECTOMY      STEREOTACTIC BRAIN BIOPSY      SUBTOTAL HYSTERECTOMY       WRIST SURGERY Right 2021    [facility] [place]      General Information       Row Name 04/15/25 1110          Physical Therapy Time and Intention    Document Type evaluation  -     Mode of Treatment individual therapy;physical therapy  -       Row Name 04/15/25 1110          General Information    Patient Profile Reviewed yes  -SM     Prior Level of Function independent:  -SM     Existing Precautions/Restrictions fall  -SM     Barriers to Rehab none identified  -       Row Name 04/15/25 1110          Living Environment    Current Living Arrangements home  -     People in Home spouse  -       Row Name 04/15/25 1110          Home Main Entrance    Number of Stairs, Main Entrance none  -       Row Name 04/15/25 1110          Cognition    Orientation Status (Cognition) oriented x 4  -       Row Name 04/15/25 1110          Safety Issues/Impairments Affecting Functional Mobility    Impairments Affecting Function (Mobility) endurance/activity tolerance;strength  -               User Key  (r) = Recorded By, (t) = Taken By, (c) = Cosigned By      Initials Name Provider Type     Mone Hampton PT Physical Therapist                   Mobility       Row Name 04/15/25 1112          Bed Mobility    Bed Mobility supine-sit;sit-supine  -     Supine-Sit Crockett (Bed Mobility) supervision  -     Sit-Supine [place]ckett (Bed Mobility) supervision  -     Assistive Device (Bed Mobility) head of bed elevated  -       Row Name 04/15/25 1112          Sit-Stand Transfer    Sit-Stand [place] (Transfers) standby assist  -[facility] Name 04/15/25 1112          Gait/Stairs (Locomotion)    Crockett Level (Gait) standby assist  -     Distance in Feet (Gait) 150  -     Comment, (Gait/Stairs) Gait slow and gaurded but steady with no overt LOB noted.  -               User Key  (r) = Recorded By, (t) = Taken By, (c) = Cosigned By      Initials Name Provider Type     Mone Hampton PT Physical  Therapist                   Obj/Interventions       Row Name 04/15/25 1113          Range of Motion Comprehensive    General Range of Motion bilateral lower extremity ROM WFL  -[facility] Name 04/15/25 1113          Strength Comprehensive (MMT)    General Manual Muscle Testing (MMT) Assessment lower extremity strength deficits identified  -     Comment, General Manual Muscle Testing (MMT) Assessment Generalized weakness  -[facility] Name 04/15/25 1113          Balance    Balance Assessment sitting static balance;sitting dynamic balance;standing static balance;standing dynamic balance  -     Static Sitting Balance independent  -     Dynamic Sitting Balance modified independence  -     Position, Sitting Balance sitting edge of bed  -     Static Standing Balance standby assist  -     Dynamic Standing Balance standby assist  -     Position/Device Used, Standing Balance unsupported  -     Balance Interventions sitting;standing;sit to stand;supported;static;dynamic  -               User Key  (r) = Recorded By, (t) = Taken By, (c) = Cosigned By      Initials Name Provider Type     Mone Hampton PT Physical Therapist                   Goals/Plan    No documentation.                  Clinical Impression       [facility] Name 04/15/25 1114          Pain    Pretreatment Pain Rating 0/10 - no pain  -     Posttreatment Pain Rating 0/10 - no pain  -[facility] Name 04/15/25 1114          Plan of Care Review    Plan of Care Reviewed With patient  -     Outcome Evaluation Patient is a 64 y.o female who presented to [facility] with nausea and vomiting. Patients spouse at bedside reports patient had trouble getting OOB yesterday due to weakness but reports patient doing much better today. Patient is independent at baseline with no AD. Patient completed all bed mobility with SBA. Patient stood and ambulated 150ft around unit with no AD and SBA. Gait slow and gaurded but steady with no overt LOB noted. Encouraged  patient to continue ambulating in hallway several times per day to maintain activity endurance. Acute PT will sign off.  -       Row Name 04/15/25 1114          Therapy Assessment/Plan (PT)    Criteria for Skilled Interventions Met (PT) no;no problems identified which require skilled intervention  -     Therapy Frequency (PT) evaluation only  -       Row Name 04/15/25 1114          Vital Signs    Pre Patient Position Supine  -     Intra Patient Position Standing  -SM     Post Patient Position Supine  -[facility] Name 04/15/25 1114          Positioning and Restraints    Pre-Treatment Position in bed  -SM     Post Treatment Position bed  -SM     In Bed notified nsg;call light within reach;encouraged to call for assist;exit alarm on;fowlers;with family/caregiver  -               User Key  (r) = Recorded By, (t) = Taken By, (c) = Cosigned By      Initials Name Provider Type    Moen Osman, PT Physical Therapist                   Outcome Measures       Row Name 04/15/25 1118 04/15/25 0905       How much help from another person do you currently need...    Turning from your back to your side while in flat bed without using bedrails? 4  -SM 4  -RJ    Moving from lying on back to sitting on the side of a flat bed without bedrails? 4  -SM 4  -RJ    Moving to and from a bed to a chair (including a wheelchair)? 4  -SM 4  -RJ    Standing up from a chair using your arms (e.g., wheelchair, bedside chair)? 4  -SM 4  -RJ    Climbing 3-5 steps with a railing? 3  -SM 3  -RJ    To walk in hospital room? 4  - 3  -RJ    AM-PAC 6 Clicks Score (PT) 23  - 22  -    Highest Level of Mobility Goal 7 --> Walk 25 feet or more  - 7 --> Walk 25 feet or more  -      Row Name 04/15/25 1118          Functional Assessment    Outcome Measure Options AM-PAC 6 Clicks Basic Mobility (PT)  -               User Key  (r) = Recorded By, (t) = Taken By, (c) = Cosigned By      Initials Name Provider Type    LEÓN Rodriguez  Mary, RN Registered Nurse     Mone Hampton, PT Physical Therapist                                 Physical Therapy Education       Title: PT OT SLP Therapies (Done)       Topic: Physical Therapy (Done)       Point: Mobility training (Done)       Learning Progress Summary            Patient Acceptance, E, VU by  at 4/15/2025 1118                      Point: Home exercise program (Done)       Learning Progress Summary            Patient Acceptance, E, VU by  at 4/15/2025 1118                      Point: Body mechanics (Done)       Learning Progress Summary            Patient Acceptance, E, VU by  at 4/15/2025 1118                      Point: Precautions (Done)       Learning Progress Summary            Patient Acceptance, E, VU by  at 4/15/2025 1118                                      User Key       Initials Effective Dates Name Provider Type Discipline     05/02/22 -  Mone Hampton PT Physical Therapist PT                  PT Recommendation and Plan     Outcome Evaluation: Patient is a 64 y.o female who presented to [facility] with nausea and vomiting. Patients spouse at bedside reports patient had trouble getting OOB yesterday due to weakness but reports patient doing much better today. Patient is independent at baseline with no AD. Patient completed all bed mobility with SBA. Patient stood and ambulated 150ft around unit with no AD and SBA. Gait slow and gaurded but steady with no overt LOB noted. Encouraged patient to continue ambulating in hallway several times per day to maintain activity endurance. Acute PT will sign off.     Time Calculation:         PT Charges       Row Name 04/15/25 1118             Time Calculation    Start Time 0919  -      Stop Time 0931  -      Time Calculation (min) 12 min  -      PT Received On 04/15/25  -         Time Calculation- PT    Total Timed Code Minutes- PT 8 minute(s)  -         Timed Charges    92137 - PT Therapeutic Activity Minutes 8  -          Total Minutes    Timed Charges Total Minutes 8  -SM       Total Minutes 8  -SM                User Key  (r) = Recorded By, (t) = Taken By, (c) = Cosigned By      Initials Name Provider Type    Mone Osman PT Physical Therapist                  Therapy Charges for Today       Code Description Service Date Service Provider Modifiers Qty    04659858634  PT THERAPEUTIC ACT EA 15 MIN 4/15/2025 Mone Hampton, PT GP 1    16106119056 HC PT EVAL LOW COMPLEXITY 3 4/15/2025 Mone Hampton, PT GP 1            PT G-Codes  Outcome Measure Options: AM-PAC 6 Clicks Basic Mobility (PT)  AM-PAC 6 Clicks Score (PT): 23  PT Discharge Summary  Anticipated Discharge Disposition (PT): home with assist    Mone Hampton PT  4/15/2025

## 2025-04-15 NOTE — PLAN OF CARE
Goal Outcome Evaluation:                                          Being d/c'd with education, IV removed, belongings taken, and will be assisted off the unit via wheelchair.

## 2025-04-15 NOTE — PROGRESS NOTES
MD ATTESTATION NOTE - Observation progress    The DELORES and I have discussed this patient's history, physical exam, and treatment plan.  I have reviewed the documentation and personally had a face to face interaction with the patient. I affirm the documentation and agree with the treatment and plan.  The attached note describes my personal findings.        SHARED APC FACE TO FACE: I performed a substantive part of the MDM during the patient's E/M visit. I personally evaluated and examined the patient. I personally made or approved the documented management plan and acknowledge its risk of complications.      Brief HPI: Patient admitted to the observation unit for nausea vomiting abdominal pain.  Patient had CT scan that was negative.  Stool studies were still pending.  Patient states she still having abdominal pain.  States she never had diarrhea.  Has had no fevers or chills.  Labs this morning normal.            GENERAL: no acute distress  HENT: nares patent  EYES: no scleral icterus  CV: regular rhythm, normal rate  RESPIRATORY: normal effort  ABDOMEN: soft.  Mild diffuse tenderness  MUSCULOSKELETAL: no deformity  NEURO: alert, moves all extremities, follows commands  PSYCH:  calm, cooperative  SKIN: warm, dry    Vital signs and nursing notes reviewed.        Plan: GI consult.  Stool studies

## 2025-04-15 NOTE — PLAN OF CARE
Goal Outcome Evaluation:    Patient admitted for N/V (resolving)  Patient sleeping between care with spouse at bedside.  GI consult.

## 2025-04-15 NOTE — DISCHARGE SUMMARY
ED OBSERVATION PROGRESS/DISCHARGE SUMMARY    Date of Admission: 4/14/2025   LOS: 0 days   PCP: Lopez Partida MD        Hospital Outcome:   Sigrid Holder is a 64-year-old female who was admitted to the ED observation unit with complaint of nausea and vomiting.  Labs were grossly unremarkable.  Urinalysis reported 3+ ketones and trace leukocytes.  CT scan of the abdomen and pelvis reported fluid distended small bowel that is nonspecific and may be associated with mild enteritis.  IV fluids given for hydration.  Vital signs stable.    GI evaluated patient and recommends supportive care with as needed antiemetic and analgesia otherwise no indication for EGD or colonoscopy and has since signed off.  Discussed the above with patient and she is in agreement with the plan.    ROS:  General: no fevers, chills  Respiratory: no cough, dyspnea  Cardiovascular: no chest pain, palpitations  Abdomen: No abdominal pain, nausea, vomiting, or diarrhea  Neurologic: No focal weakness    Objective   Physical Exam:  I have reviewed the vital signs.  Temp:  [97.3 °F (36.3 °C)-97.9 °F (36.6 °C)] 97.8 °F (36.6 °C)  Heart Rate:  [69-87] 69  Resp:  [16-19] 18  BP: (119-154)/(45-81) 119/45  General Appearance:    Alert, cooperative, no distress  Head:    Normocephalic, atraumatic  Eyes:    Sclerae anicteric  Neck:   Supple, no mass  Lungs: Clear to auscultation bilaterally, respirations unlabored  Heart: Regular rate and rhythm, S1 and S2 normal, no murmur, rub or gallop  Abdomen:  Soft, nontender, bowel sounds active, nondistended  Extremities: No clubbing, cyanosis, or edema to lower extremities  Pulses:  2+ and symmetric in distal lower extremities  Skin: No rashes   Neurologic: Oriented x3, Normal strength to extremities    Results Review:    I have reviewed the labs, radiology results and diagnostic studies.    Results from last 7 days   Lab Units 04/14/25  1048   WBC 10*3/mm3 8.02   HEMOGLOBIN g/dL 14.5   HEMATOCRIT % 44.6    PLATELETS 10*3/mm3 306     Results from last 7 days   Lab Units 04/14/25  1048   SODIUM mmol/L 135*   POTASSIUM mmol/L 3.5   CHLORIDE mmol/L 97*   CO2 mmol/L 26.3   BUN mg/dL 12   CREATININE mg/dL 0.52*   CALCIUM mg/dL 9.5   BILIRUBIN mg/dL 0.2   ALK PHOS U/L 131*   ALT (SGPT) U/L 33   AST (SGOT) U/L 28   GLUCOSE mg/dL 133*     Imaging Results (Last 24 Hours)       Procedure Component Value Units Date/Time    CT Angiogram Abdomen Pelvis [[id number]] Collected: 04/14/25 1244     Updated: 04/14/25 1423    Narrative:      CT ANGIOGRAM ABDOMEN AND PELVIS WITH IV CONTRAST     HISTORY: Abdominal pain. Nausea and vomiting.      TECHNIQUE: CT angiogram abdomen and pelvis with IV contrast in the  arterial phase of contrast. Data reconstructed in coronal and sagittal  planes and 3D volume rendering performed.     COMPARISON: None.     FINDINGS: Distal thoracic aorta appears within normal limits. Mild  narrowing of the celiac artery origin. Superior mesenteric artery, both  main renal arteries, accessory left lower pole renal artery, inferior  mesenteric artery are patent. No abdominal aortic aneurysm. Mild  calcified plaque origin of left common iliac artery. The common iliac  arteries, internal/external iliac arteries, common femoral arteries,  proximal superficial and deep femoral arteries are patent.     Lung bases are clear. Liver, adrenal glands, pancreas, spleen appear  within normal limits. There is mild right mid to upper pole renal  cortical thinning and scarring. No hydronephrosis. Left kidney is  normal. Urinary bladder appears within the limits.     There are fluid-filled mildly distended small bowel loops within the  central and right pelvis measuring up to 3.2 cm. More proximal bowel  loops are not dilated. No colonic distention or evidence for colitis. No  julia enlargement is demonstrated in the abdomen or pelvis. No evidence  for appendicitis. No ascites.       Impression:      1. No evidence for significant  stenosis, dissection, or aneurysm.  2. Fluid distended small bowel within the central to right pelvis. This  is nonspecific and may be associated with mild enteritis. No abnormal  wall thickening is present. There is no upstream dilatation to suggest  obstruction though follow-up CT can be obtained if patient's symptoms  persist or worsen.     Radiation dose reduction techniques were utilized, including automated  exposure control and exposure modulation based on body size.        This report was finalized on 4/14/2025 2:20 PM by Raimundo Duran M.D  on Workstation: [id number]       XR Chest 1 View [[id number]] Collected: 04/14/25 1138     Updated: 04/14/25 1142    Narrative:      XR CHEST 1 VW-     Clinical: Abdominal pain     FINDINGS: Heart size within normal limits, no mediastinal or hilar  abnormality is demonstrated and the lungs are clear.     Occlusion: No active disease of the chest     This report was finalized on 4/14/2025 11:39 AM by Dr. Abisai Storey M.D on Workstation: [id number]               I have reviewed the medications.     Discharge Medications        New Medications        Instructions Start Date   ciprofloxacin 500 MG tablet  Commonly known as: CIPRO   500 mg, Oral, 2 Times Daily      ondansetron ODT 4 MG disintegrating tablet  Commonly known as: ZOFRAN-ODT   4 mg, Translingual, Every 8 Hours PRN             Changes to Medications        Instructions Start Date   dicyclomine 10 MG capsule  Commonly known as: BENTYL  What changed: Another medication with the same name was added. Make sure you understand how and when to take each.   2 Times Daily      dicyclomine 20 MG tablet  Commonly known as: BENTYL  What changed: You were already taking a medication with the same name, and this prescription was added. Make sure you understand how and when to take each.   20 mg, Oral, Every 6 Hours PRN             Continue These Medications        Instructions Start Date   Advair Diskus 250-50 MCG/DOSE  DISKUS  Generic drug: Fluticasone-Salmeterol   No dose, route, or frequency recorded.      anastrozole 1 MG tablet  Commonly known as: ARIMIDEX   1 mg, Oral, Daily      ascorbic acid 1000 MG tablet  Commonly known as: VITAMIN C   No dose, route, or frequency recorded.      Aspirin Buf(CaCarb-MgCarb-MgO) 81 MG tablet   Take  by mouth.      calcium carb-cholecalciferol 600-800 MG-UNIT tablet   Take  by mouth.      cetirizine 10 MG tablet  Commonly known as: zyrTEC   No dose, route, or frequency recorded.      CRAMP RELEAF PO   Take  by mouth.      desloratadine 5 MG tablet  Commonly known as: CLARINEX   No dose, route, or frequency recorded.      dexlansoprazole 60 MG capsule  Commonly known as: DEXILANT   Daily      Docusate Sodium 100 MG capsule   Daily      empagliflozin 25 MG tablet tablet  Commonly known as: JARDIANCE   Take  by mouth.      exenatide er 2 MG pen-injector injection  Commonly known as: BYDUREON   Inject  under the skin into the appropriate area as directed.      famotidine 40 MG tablet  Commonly known as: PEPCID   40 mg, Oral, Daily      FreeStyle Wilbur 2 Sensor misc   CHECK BLOOD GLUCOSE AS DIRECTED AND REPLACE EVERY 14 DAYS      gabapentin 300 MG capsule  Commonly known as: NEURONTIN   1,200 mg, 3 Times Daily      icosapent ethyl 1 g capsule capsule  Commonly known as: VASCEPA   TAKE 2 CAPSULES (2 GRAMS) TWICE A DAY WITH MEALS      insulin glargine 100 UNIT/ML injection  Commonly known as: LANTUS, SEMGLEE   18 Units, Daily      levothyroxine 88 MCG tablet  Commonly known as: SYNTHROID, LEVOTHROID   1 tablet, Daily      LORazepam 0.5 MG tablet  Commonly known as: Ativan   0.5 mg, Oral, Every 8 Hours PRN      metFORMIN 850 MG tablet  Commonly known as: GLUCOPHAGE   1 tablet, Every 12 Hours Scheduled      montelukast 10 MG tablet  Commonly known as: SINGULAIR   No dose, route, or frequency recorded.      multivitamin with minerals tablet tablet   Daily      NIFEdipine CC 30 MG 24 hr tablet  Commonly  known as: ADALAT CC   No dose, route, or frequency recorded.      oxybutynin XL 5 MG 24 hr tablet  Commonly known as: DITROPAN-XL   5 mg, Oral, Daily      Ozempic (0.25 or 0.5 MG/DOSE) 2 MG/1.5ML solution pen-injector  Generic drug: Semaglutide(0.25 or 0.5MG/DOS)   Ozempic (0.25 or 0.5 MG/DOSE)      pantoprazole 40 MG EC tablet  Commonly known as: PROTONIX   No dose, route, or frequency recorded.      pravastatin 40 MG tablet  Commonly known as: PRAVACHOL   Take  by mouth.      ProAir  (90 Base) MCG/ACT inhaler  Generic drug: albuterol sulfate HFA   No dose, route, or frequency recorded.      sertraline 25 MG tablet  Commonly known as: Zoloft   25 mg, Oral, Daily      telmisartan 40 MG tablet  Commonly known as: MICARDIS   Daily      valACYclovir 500 MG tablet  Commonly known as: VALTREX   500 mg, 2 Times Daily      Xylitol 500 MG disk   XyliMelts 500 MG Mouth/Throat Disk; Patient Sig: XyliMelts 500 MG Mouth/Throat Disk 1 IN THE EVENING; 0; 05-Feb-2015; Active      Zetonna 37 MCG/ACT aerosol solution  Generic drug: Ciclesonide   No dose, route, or frequency recorded.              ---------------------------------------------------------------------------------------------  Assessment & Plan   Assessment/Problem List    Nausea with vomiting      Plan:    Nausea with vomiting  CTA abdomen pelvis suggestive of enteritis  Consulted GI  Compazine for nausea  Clear liquid diet, n.p.o. after midnight  Send GI panel if patient happens to have loose stools  2 L normal saline bolus given in ED, LR at 125 mL/h     Hypertension  Vital signs every 4 hours  Continue home medications     Type 2 diabetes with hyperglycemia  Check A1c  Hold metformin for 48 hours following administration of CT contrast  Continue Jardiance  Low-dose correctional sliding scale insulin protocol initiated    Disposition: Home    Follow-up after Discharge: PCP and GI    This note will serve as progress note    Fallon Valentino, APRN 04/15/25 03:47  EDT    I have worn appropriate PPE during this patient encounter, sanitized my hands both with entering and exiting patient's room.      30 minutes has been spent by [facility] providers in the care of this patient while under observation status on this date 04/15/25

## 2025-04-16 ENCOUNTER — TRANSITIONAL CARE MANAGEMENT TELEPHONE ENCOUNTER (OUTPATIENT)
Dept: CALL CENTER | Facility: HOSPITAL | Age: 65
End: 2025-04-16
Payer: OTHER GOVERNMENT

## 2025-04-16 NOTE — OUTREACH NOTE
Call Center TCM Note      Flowsheet Row Responses   [facility] patient discharged from? [place]   Does the patient have one of the following disease processes/diagnoses(primary or secondary)? Other   TCM attempt successful? Yes  [vr for Pan, spouse]   Call start time 1459   Call end time 1503   Discharge diagnosis Nausea and vomiting   Meds reviewed with patient/caregiver? Yes   Is the patient having any side effects they believe may be caused by any medication additions or changes? No   Does the patient have all medications ordered at discharge? Yes   Is the patient taking all medications as directed (includes completed medication regime)? Yes   Comments Hospital f/u 4/23/25@1100am   Does the patient have an appointment with their PCP within 7-14 days of discharge? Other   Nursing Interventions Assisted patient with making appointment per protocol   Has home health visited the patient within 72 hours of discharge? N/A   Psychosocial issues? No   Did the patient receive a copy of their discharge instructions? Yes   Nursing interventions Reviewed instructions with patient   What is the patient's perception of their health status since discharge? Same  [Reports some nausea but no vomiting at this time, taking it easy with diet at this time. pt has no questions today]   Is the patient/caregiver able to teach back signs and symptoms related to disease process for when to call PCP? Yes   Is the patient/caregiver able to teach back signs and symptoms related to disease process for when to call 911? Yes   TCM call completed? Yes   Call end time 1503            BREANNA DUARTE - Registered Nurse    4/16/2025, 15:13 EDT

## 2025-04-16 NOTE — OUTREACH NOTE
Prep Survey      Flowsheet Row Responses   [facility] [place] facility patient discharged from? [place]   Is LACE score < 7 ? Yes   Eligibility [facility]   Date of Admission 04/14/25   Date of Discharge 04/15/25   Discharge Disposition Home or Self Care   Discharge diagnosis Nausea and vomiting   Does the patient have one of the following disease processes/diagnoses(primary or secondary)? Other   Does the patient have Home health ordered? No   Is there a DME ordered? No   Prep survey completed? Yes            DEDE HODGSON - Registered Nurse

## 2025-04-22 NOTE — PROGRESS NOTES
Patient Name: Sigrid Holder  : 1960   MRN: [id number]     Chief Complaint:    Chief Complaint   Patient presents with    Hospital Follow Up Visit     Admitted to [facility] on 2025 for nausea and vomiting. Patient discharged home on 4/15/2025. All medication reconciled.       History of Present Illness: Sigrid Holder is a 64 y.o. female who is here today for follow up from ER visit and was admited to [facility]  HPI        Review of Systems:   Review of Systems   Constitutional: Negative.    HENT: Negative.     Eyes: Negative.    Respiratory: Negative.     Cardiovascular: Negative.    Gastrointestinal: Negative.    Neurological: Negative.         Past Medical History:   Past Medical History:   Diagnosis Date    Arthritis     Asthma     Bulging of intervertebral disc between L4 and L5     GERD (gastroesophageal reflux disease)     High risk medication use     DRUG THERPAY FINDING    History of transfusion     Hyperlipidemia     Hypertension     Hypothyroidism     IBS (irritable bowel syndrome)     Iron deficiency anemia     Leukocytosis     Low back pain     Malignant neoplasm of female breast     Sleep apnea     Type 2 diabetes mellitus     Vitamin D deficiency        Past Surgical History:   Past Surgical History:   Procedure Laterality Date    BREAST SURGERY  2018    CHOLECYSTECTOMY      COLONOSCOPY N/A 2023    Procedure: COLONOSCOPY;  Surgeon: Alexander Delcid MD;  [place] [facility];  Service: Gastroenterology;  Laterality: N/A;  Diverticulosis, Ascending Polyp    HYSTERECTOMY      STEREOTACTIC BRAIN BIOPSY      SUBTOTAL HYSTERECTOMY      WRIST SURGERY Right     [facility] [place]       Family History:   Family History   Problem Relation Age of Onset    Breast cancer Mother     Cancer Mother         Breast    Stroke Father     Heart disease Father     Alcohol abuse Father     Diabetes Father     Hyperlipidemia Father     Hypertension Father     Pancreatic cancer  Sister     Cancer Sister         Pancreatitis    Anxiety disorder Sister     COPD Sister     Depression Sister     Diabetes Sister     Hyperlipidemia Sister     Hypertension Sister     Miscarriages / Stillbirths Sister     Anxiety disorder Sister     Depression Sister     Diabetes Sister     Hyperlipidemia Sister     Hypertension Sister        Social History:   Social History     Socioeconomic History    Marital status:    Tobacco Use    Smoking status: Former     Current packs/day: 0.00     Average packs/day: 1 pack/day for 7.0 years (7.0 ttl pk-yrs)     Types: Cigarettes     Start date: 1979     Quit date:      Years since quittin.3     Passive exposure: Past    Smokeless tobacco: Never   Vaping Use    Vaping status: Never Used   Substance and Sexual Activity    Alcohol use: Not Currently     Comment: DO NOT DRINK ANYMORE    Drug use: Never    Sexual activity: Not Currently     Partners: Male       Medications:     Current Outpatient Medications:     ADVAIR DISKUS 250-50 MCG/DOSE DISKUS, , Disp: , Rfl:     anastrozole (ARIMIDEX) 1 MG tablet, Take 1 tablet by mouth Daily., Disp: 90 tablet, Rfl: 3    ascorbic acid (VITAMIN C) 1000 MG tablet, , Disp: , Rfl:     Aspirin Buf,CaCarb-MgCarb-MgO, 81 MG tablet, Take  by mouth., Disp: , Rfl:     calcium carb-cholecalciferol 600-800 MG-UNIT tablet, Take  by mouth., Disp: , Rfl:     cetirizine (zyrTEC) 10 MG tablet, , Disp: , Rfl:     Continuous Glucose Sensor (FreeStyle Wilbur 2 Sensor) misc, CHECK BLOOD GLUCOSE AS DIRECTED AND REPLACE EVERY 14 DAYS, Disp: , Rfl:     desloratadine (CLARINEX) 5 MG tablet, , Disp: , Rfl:     dexlansoprazole (DEXILANT) 60 MG capsule, Take  by mouth Daily., Disp: , Rfl:     dicyclomine (BENTYL) 10 MG capsule, Take  by mouth 2 (Two) Times a Day., Disp: , Rfl:     dicyclomine (BENTYL) 20 MG tablet, Take 1 tablet by mouth Every 6 (Six) Hours As Needed for Abdominal Cramping., Disp: 6 tablet, Rfl: 0    Docusate Sodium 100 MG  capsule, Take  by mouth Daily., Disp: , Rfl:     empagliflozin (JARDIANCE) 25 MG tablet tablet, Take  by mouth., Disp: , Rfl:     exenatide er (BYDUREON) 2 MG pen-injector injection, Inject  under the skin into the appropriate area as directed., Disp: , Rfl:     gabapentin (NEURONTIN) 300 MG capsule, Take 4 capsules by mouth 3 (Three) Times a Day., Disp: , Rfl:     icosapent ethyl (VASCEPA) 1 g capsule capsule, TAKE 2 CAPSULES (2 GRAMS) TWICE A DAY WITH MEALS, Disp: 360 capsule, Rfl: 0    insulin glargine (LANTUS, SEMGLEE) 100 UNIT/ML injection, Inject 18 Units under the skin into the appropriate area as directed Daily., Disp: , Rfl:     levothyroxine (SYNTHROID, LEVOTHROID) 88 MCG tablet, Take 1 tablet by mouth Daily., Disp: , Rfl:     LORazepam (Ativan) 0.5 MG tablet, Take 1 tablet by mouth Every 8 (Eight) Hours As Needed for Anxiety., Disp: 30 tablet, Rfl: 2    metFORMIN (GLUCOPHAGE) 850 MG tablet, Take 1 tablet by mouth Every 12 (Twelve) Hours., Disp: , Rfl:     Misc Natural Products (CRAMP RELEAF PO), Take  by mouth., Disp: , Rfl:     montelukast (SINGULAIR) 10 MG tablet, , Disp: , Rfl:     Multiple Vitamins-Minerals (CENTRUM SILVER 50+WOMEN PO), Take  by mouth Daily., Disp: , Rfl:     NIFEdipine CC (ADALAT CC) 30 MG 24 hr tablet, , Disp: , Rfl:     ondansetron ODT (ZOFRAN-ODT) 4 MG disintegrating tablet, Place 1 tablet on the tongue Every 8 (Eight) Hours As Needed for Nausea or Vomiting., Disp: 6 tablet, Rfl: 0    oxybutynin XL (DITROPAN-XL) 5 MG 24 hr tablet, TAKE 1 TABLET DAILY, Disp: 90 tablet, Rfl: 0    pantoprazole (PROTONIX) 40 MG EC tablet, , Disp: , Rfl:     pravastatin (PRAVACHOL) 40 MG tablet, Take  by mouth., Disp: , Rfl:     PROAIR  (90 Base) MCG/ACT inhaler, , Disp: , Rfl:     Semaglutide,0.25 or 0.5MG/DOS, (Ozempic, 0.25 or 0.5 MG/DOSE,) 2 MG/1.5ML solution pen-injector, Ozempic (0.25 or 0.5 MG/DOSE), Disp: , Rfl:     sertraline (Zoloft) 25 MG tablet, Take 1 tablet by mouth Daily., Disp:  "90 tablet, Rfl: 1    telmisartan (MICARDIS) 40 MG tablet, Take  by mouth Daily., Disp: , Rfl:     valACYclovir (VALTREX) 500 MG tablet, Take 1 tablet by mouth 2 (Two) Times a Day., Disp: , Rfl:     Xylitol 500 MG disk, XyliMelts 500 MG Mouth/Throat Disk; Patient Sig: XyliMelts 500 MG Mouth/Throat Disk 1 IN THE EVENING; 0; 05-Feb-2015; Active, Disp: , Rfl:     ZETONNA 37 MCG/ACT aerosol solution, , Disp: , Rfl:     Current Facility-Administered Medications:     BREAST PROSTHESIS misc 1 Bag., 1 Bag., Not Applicable, Daily, Jessica Bernard APRN    BREAST PROSTHESIS misc 2 Units, 2 Units, Not Applicable, PRN, Jessica Bernard APRN    Allergies:   Allergies   Allergen Reactions    Liraglutide Nausea And Vomiting     Pt not sure if shes allergic to this or not.         Physical Exam:  Vital Signs:   Vitals:    04/23/25 1102   BP: 120/82   BP Location: Left arm   Patient Position: Sitting   Cuff Size: Adult   Pulse: 78   Resp: 16   SpO2: 98%   Weight: 60.2 kg (132 lb 11.2 oz)   Height: 160 cm (62.99\")   PainSc: 0-No pain     Body mass index is 23.51 kg/m².     Physical Exam  Vitals and nursing note reviewed.   Constitutional:       Appearance: Normal appearance. She is normal weight.   HENT:      Head: Normocephalic and atraumatic.      Right Ear: Tympanic membrane, ear canal and external ear normal.      Left Ear: Tympanic membrane, ear canal and external ear normal.      Nose: Nose normal.      Mouth/Throat:      Mouth: Mucous membranes are dry.      Pharynx: Oropharynx is clear.   Eyes:      Extraocular Movements: Extraocular movements intact.      Conjunctiva/sclera: Conjunctivae normal.      Pupils: Pupils are equal, round, and reactive to light.   Cardiovascular:      Rate and Rhythm: Normal rate and regular rhythm.      Pulses: Normal pulses.      Heart sounds: Normal heart sounds.   Pulmonary:      Effort: Pulmonary effort is normal.      Breath sounds: Normal breath sounds.   Musculoskeletal:      Cervical back: " Normal range of motion and neck supple.   Feet:      Comments:      Neurological:      Mental Status: She is alert.         Procedures      Assessment/Plan:   Diagnoses and all orders for this visit:    1. Gastroenteritis (Primary)  Assessment & Plan:  Reviewed patient's ER and hospital visits.  She probably had a viral gastroenteritis.  My bet would be norovirus.  Patient is doing better.  She is in to drink 5 glasses of water a day.  Return to clinic if worse anyway.      2. Irritable bowel syndrome, unspecified type  Assessment & Plan:  Symptoms have been changed.  I talked her at length about the possibility of lactose intolerance after a GI virus.  We will follow.      3. Benign essential hypertension  Assessment & Plan:  Blood pressure has been doing well since she has been sick.  She is going to monitor it.      4. Type 2 diabetes mellitus without complication, without long-term current use of insulin  -     Microalbumin / Creatinine Urine Ratio - Urine, Clean Catch    We will check an ACR today.        Follow Up:   No follow-ups on file.      Lopez Partida MD  [facility]

## 2025-04-23 ENCOUNTER — OFFICE VISIT (OUTPATIENT)
Dept: FAMILY MEDICINE CLINIC | Facility: CLINIC | Age: 65
End: 2025-04-23
Payer: OTHER GOVERNMENT

## 2025-04-23 VITALS
DIASTOLIC BLOOD PRESSURE: 82 MMHG | HEART RATE: 78 BPM | RESPIRATION RATE: 16 BRPM | BODY MASS INDEX: 23.51 KG/M2 | SYSTOLIC BLOOD PRESSURE: 120 MMHG | OXYGEN SATURATION: 98 % | WEIGHT: 132.7 LBS | HEIGHT: 63 IN

## 2025-04-23 DIAGNOSIS — I10 BENIGN ESSENTIAL HYPERTENSION: ICD-10-CM

## 2025-04-23 DIAGNOSIS — K52.9 GASTROENTERITIS: Primary | ICD-10-CM

## 2025-04-23 DIAGNOSIS — K58.9 IRRITABLE BOWEL SYNDROME, UNSPECIFIED TYPE: ICD-10-CM

## 2025-04-23 DIAGNOSIS — E11.9 TYPE 2 DIABETES MELLITUS WITHOUT COMPLICATION, WITHOUT LONG-TERM CURRENT USE OF INSULIN: ICD-10-CM

## 2025-04-23 NOTE — ASSESSMENT & PLAN NOTE
Reviewed patient's ER and hospital visits.  She probably had a viral gastroenteritis.  My bet would be norovirus.  Patient is doing better.  She is in to drink 5 glasses of water a day.  Return to clinic if worse anyway.

## 2025-04-23 NOTE — ASSESSMENT & PLAN NOTE
Symptoms have been changed.  I talked her at length about the possibility of lactose intolerance after a GI virus.  We will follow.

## 2025-04-24 LAB
ALBUMIN/CREAT UR: <9 MG/G CREAT (ref 0–29)
CREAT UR-MCNC: 35.2 MG/DL
MICROALBUMIN UR-MCNC: <3 UG/ML

## 2025-06-16 ENCOUNTER — LAB (OUTPATIENT)
Dept: FAMILY MEDICINE CLINIC | Facility: CLINIC | Age: 65
End: 2025-06-16
Payer: OTHER GOVERNMENT

## 2025-06-16 DIAGNOSIS — E03.9 ACQUIRED HYPOTHYROIDISM: ICD-10-CM

## 2025-06-16 DIAGNOSIS — E11.9 TYPE 2 DIABETES MELLITUS WITHOUT COMPLICATION, WITHOUT LONG-TERM CURRENT USE OF INSULIN: Primary | ICD-10-CM

## 2025-06-16 DIAGNOSIS — E55.9 VITAMIN D DEFICIENCY: ICD-10-CM

## 2025-06-16 DIAGNOSIS — E78.2 MIXED HYPERLIPIDEMIA: ICD-10-CM

## 2025-06-17 LAB
25(OH)D3+25(OH)D2 SERPL-MCNC: 53.4 NG/ML (ref 30–100)
ALBUMIN SERPL-MCNC: 4.5 G/DL (ref 3.9–4.9)
ALP SERPL-CCNC: 169 IU/L (ref 44–121)
ALT SERPL-CCNC: 23 IU/L (ref 0–32)
AST SERPL-CCNC: 20 IU/L (ref 0–40)
BASOPHILS # BLD AUTO: 0.1 X10E3/UL (ref 0–0.2)
BASOPHILS NFR BLD AUTO: 0 %
BILIRUB SERPL-MCNC: <0.2 MG/DL (ref 0–1.2)
BUN SERPL-MCNC: 20 MG/DL (ref 8–27)
BUN/CREAT SERPL: 32 (ref 12–28)
CALCIUM SERPL-MCNC: 9.7 MG/DL (ref 8.7–10.3)
CHLORIDE SERPL-SCNC: 96 MMOL/L (ref 96–106)
CHOLEST SERPL-MCNC: 170 MG/DL (ref 100–199)
CK SERPL-CCNC: 53 U/L (ref 32–182)
CO2 SERPL-SCNC: 19 MMOL/L (ref 20–29)
CREAT SERPL-MCNC: 0.63 MG/DL (ref 0.57–1)
EGFRCR SERPLBLD CKD-EPI 2021: 100 ML/MIN/1.73
EOSINOPHIL # BLD AUTO: 0.2 X10E3/UL (ref 0–0.4)
EOSINOPHIL NFR BLD AUTO: 1 %
ERYTHROCYTE [DISTWIDTH] IN BLOOD BY AUTOMATED COUNT: 14.4 % (ref 11.7–15.4)
GLOBULIN SER CALC-MCNC: 2.7 G/DL (ref 1.5–4.5)
GLUCOSE SERPL-MCNC: 184 MG/DL (ref 70–99)
HBA1C MFR BLD: 7.3 % (ref 4.8–5.6)
HCT VFR BLD AUTO: 42.1 % (ref 34–46.6)
HDLC SERPL-MCNC: 54 MG/DL
HGB BLD-MCNC: 13 G/DL (ref 11.1–15.9)
IMM GRANULOCYTES # BLD AUTO: 0 X10E3/UL (ref 0–0.1)
IMM GRANULOCYTES NFR BLD AUTO: 0 %
LDLC SERPL CALC-MCNC: 84 MG/DL (ref 0–99)
LYMPHOCYTES # BLD AUTO: 2.3 X10E3/UL (ref 0.7–3.1)
LYMPHOCYTES NFR BLD AUTO: 19 %
MCH RBC QN AUTO: 27.9 PG (ref 26.6–33)
MCHC RBC AUTO-ENTMCNC: 30.9 G/DL (ref 31.5–35.7)
MCV RBC AUTO: 90 FL (ref 79–97)
MONOCYTES # BLD AUTO: 0.8 X10E3/UL (ref 0.1–0.9)
MONOCYTES NFR BLD AUTO: 7 %
NEUTROPHILS # BLD AUTO: 8.6 X10E3/UL (ref 1.4–7)
NEUTROPHILS NFR BLD AUTO: 73 %
PLATELET # BLD AUTO: 347 X10E3/UL (ref 150–450)
POTASSIUM SERPL-SCNC: 4.3 MMOL/L (ref 3.5–5.2)
PROT SERPL-MCNC: 7.2 G/DL (ref 6–8.5)
RBC # BLD AUTO: 4.66 X10E6/UL (ref 3.77–5.28)
SODIUM SERPL-SCNC: 137 MMOL/L (ref 134–144)
TRIGL SERPL-MCNC: 190 MG/DL (ref 0–149)
TSH SERPL DL<=0.005 MIU/L-ACNC: 2.71 UIU/ML (ref 0.45–4.5)
VIT B12 SERPL-MCNC: 410 PG/ML (ref 232–1245)
VLDLC SERPL CALC-MCNC: 32 MG/DL (ref 5–40)
WBC # BLD AUTO: 12 X10E3/UL (ref 3.4–10.8)

## 2025-06-20 PROBLEM — Z00.00 PHYSICAL EXAM: Status: ACTIVE | Noted: 2025-06-20

## 2025-06-20 NOTE — PROGRESS NOTES
Patient Name: Sigrid Holder  : 1960   MRN: [id number]     Chief Complaint:    Chief Complaint   Patient presents with    Annual Exam       History of Present Illness: Sigrid Holder is a 64 y.o. female who is here today for their annual health maintenance and physical.  And go over blood sugar, cholesterol, white count, thyroid and vitamin D         Review of Systems:   Review of Systems   Constitutional: Negative.    HENT: Negative.     Eyes: Negative.    Respiratory: Negative.     Cardiovascular: Negative.    Gastrointestinal: Negative.    Neurological: Negative.        Past Medical History, Social History, Family History and Care Team were all reviewed with patient and updated as appropriate.     Medications:     Current Outpatient Medications:     ADVAIR DISKUS 250-50 MCG/DOSE DISKUS, , Disp: , Rfl:     anastrozole (ARIMIDEX) 1 MG tablet, Take 1 tablet by mouth Daily., Disp: 90 tablet, Rfl: 3    ascorbic acid (VITAMIN C) 1000 MG tablet, , Disp: , Rfl:     Aspirin Buf,CaCarb-MgCarb-MgO, 81 MG tablet, Take  by mouth., Disp: , Rfl:     calcium carb-cholecalciferol 600-800 MG-UNIT tablet, Take  by mouth., Disp: , Rfl:     cetirizine (zyrTEC) 10 MG tablet, , Disp: , Rfl:     Continuous Glucose Sensor (FreeStyle Wilbur 2 Sensor) misc, CHECK BLOOD GLUCOSE AS DIRECTED AND REPLACE EVERY 14 DAYS, Disp: , Rfl:     desloratadine (CLARINEX) 5 MG tablet, , Disp: , Rfl:     dexlansoprazole (DEXILANT) 60 MG capsule, Take  by mouth Daily., Disp: , Rfl:     dicyclomine (BENTYL) 10 MG capsule, Take  by mouth 2 (Two) Times a Day., Disp: , Rfl:     dicyclomine (BENTYL) 20 MG tablet, Take 1 tablet by mouth Every 6 (Six) Hours As Needed for Abdominal Cramping., Disp: 6 tablet, Rfl: 0    Docusate Sodium 100 MG capsule, Take  by mouth Daily., Disp: , Rfl:     empagliflozin (JARDIANCE) 25 MG tablet tablet, Take  by mouth., Disp: , Rfl:     exenatide er (BYDUREON) 2 MG pen-injector injection, Inject  under the skin  into the appropriate area as directed., Disp: , Rfl:     gabapentin (NEURONTIN) 300 MG capsule, Take 4 capsules by mouth 3 (Three) Times a Day., Disp: , Rfl:     icosapent ethyl (VASCEPA) 1 g capsule capsule, Take 2 g by mouth 2 (Two) Times a Day With Meals., Disp: 360 capsule, Rfl: 2    insulin glargine (LANTUS, SEMGLEE) 100 UNIT/ML injection, Inject 18 Units under the skin into the appropriate area as directed Daily., Disp: , Rfl:     levothyroxine (SYNTHROID, LEVOTHROID) 88 MCG tablet, Take 1 tablet by mouth Daily., Disp: , Rfl:     LORazepam (Ativan) 0.5 MG tablet, Take 1 tablet by mouth Every 8 (Eight) Hours As Needed for Anxiety., Disp: 30 tablet, Rfl: 2    metFORMIN (GLUCOPHAGE) 850 MG tablet, Take 1 tablet by mouth Every 12 (Twelve) Hours., Disp: , Rfl:     Misc Natural Products (CRAMP RELEAF PO), Take  by mouth., Disp: , Rfl:     montelukast (SINGULAIR) 10 MG tablet, , Disp: , Rfl:     Multiple Vitamins-Minerals (CENTRUM SILVER 50+WOMEN PO), Take  by mouth Daily., Disp: , Rfl:     NIFEdipine CC (ADALAT CC) 30 MG 24 hr tablet, , Disp: , Rfl:     ondansetron ODT (ZOFRAN-ODT) 4 MG disintegrating tablet, Place 1 tablet on the tongue Every 8 (Eight) Hours As Needed for Nausea or Vomiting., Disp: 6 tablet, Rfl: 0    oxybutynin XL (DITROPAN-XL) 5 MG 24 hr tablet, TAKE 1 TABLET DAILY, Disp: 90 tablet, Rfl: 0    pantoprazole (PROTONIX) 40 MG EC tablet, , Disp: , Rfl:     pravastatin (PRAVACHOL) 40 MG tablet, Take  by mouth., Disp: , Rfl:     PROAIR  (90 Base) MCG/ACT inhaler, , Disp: , Rfl:     Semaglutide,0.25 or 0.5MG/DOS, (Ozempic, 0.25 or 0.5 MG/DOSE,) 2 MG/1.5ML solution pen-injector, Ozempic (0.25 or 0.5 MG/DOSE), Disp: , Rfl:     sertraline (Zoloft) 25 MG tablet, Take 1 tablet by mouth Daily., Disp: 90 tablet, Rfl: 1    telmisartan (MICARDIS) 40 MG tablet, Take  by mouth Daily., Disp: , Rfl:     valACYclovir (VALTREX) 500 MG tablet, Take 1 tablet by mouth 2 (Two) Times a Day., Disp: , Rfl:     Xylitol  500 MG disk, XyliMelts 500 MG Mouth/Throat Disk; Patient Sig: XyliMelts 500 MG Mouth/Throat Disk 1 IN THE EVENING; 0; 05-Feb-2015; Active, Disp: , Rfl:     ZETONNA 37 MCG/ACT aerosol solution, , Disp: , Rfl:     Current Facility-Administered Medications:     BREAST PROSTHESIS misc 1 Bag., 1 Bag., Not Applicable, Daily, Jessica Bernard APRN    BREAST PROSTHESIS misc 2 Units, 2 Units, Not Applicable, PRN, Jessica Bernard APRN    Allergies:   Allergies   Allergen Reactions    Liraglutide Nausea And Vomiting     Pt not sure if shes allergic to this or not.       Health Maintenance   Topic Date Due    DIABETIC FOOT EXAM  06/05/2025    DIABETIC EYE EXAM  07/20/2027 (Originally 11/28/1970)    INFLUENZA VACCINE  07/01/2025    HEMOGLOBIN A1C  12/16/2025    URINE MICROALBUMIN-CREATININE RATIO (uACR)  04/23/2026    LIPID PANEL  06/16/2026    ANNUAL PHYSICAL  06/23/2026    COLORECTAL CANCER SCREENING  02/22/2028    TDAP/TD VACCINES (4 - Td or Tdap) 07/20/2033    HEPATITIS C SCREENING  Completed    COVID-19 Vaccine  Completed    Pneumococcal Vaccine 50+  Completed    ZOSTER VACCINE  Completed        PHQ-2 Total Score:          Intimate partner violence: (Screen on initial visit, pregnant women, women with injuries, older adult with injury or evidence of neglect):  Violence can be a problem in many people's lives, so I now ask every patient about trauma or abuse they may have experienced in a relationship.  Stress/Safety - Do you feel safe in your relationship?  Afraid/Abused - Have you ever been in a relationship where you were threatened, hurt, or afraid?  Friend/Family - Are your friends aware you have been hurt?  Emergency Plan - Do you have a safe place to go and the resources you need in an emergency?    Osteoporosis:   Ost menopausal women < 65 with RF (advancing age, previous fracture, glucocorticoid therapy, parental hip fracture, low body weight, current cigarette smoking, excessive alcohol consumption, rheumatoid  "arthritis, secondary osteoporosis [hypogonadism/premature menopause, malabsorption, chronic liver disease, IBD]).  All women 65 or older      Physical Exam:  Vital Signs:   Vitals:    06/23/25 1133   BP: 120/62   BP Location: Left arm   Patient Position: Sitting   Cuff Size: Adult   Pulse: 71   SpO2: 98%   Weight: 61 kg (134 lb 8 oz)   Height: 160 cm (62.99\")   PainSc: 0-No pain     Body mass index is 23.83 kg/m².     Physical Exam  Vitals and nursing note reviewed.   Constitutional:       Appearance: Normal appearance. She is normal weight.   HENT:      Head: Normocephalic and atraumatic.      Right Ear: Tympanic membrane, ear canal and external ear normal.      Left Ear: Tympanic membrane, ear canal and external ear normal.      Nose: Nose normal.      Mouth/Throat:      Mouth: Mucous membranes are moist.      Pharynx: Oropharynx is clear.   Eyes:      Extraocular Movements: Extraocular movements intact.      Conjunctiva/sclera: Conjunctivae normal.      Pupils: Pupils are equal, round, and reactive to light.   Cardiovascular:      Rate and Rhythm: Normal rate and regular rhythm.      Pulses: Normal pulses.      Heart sounds: Normal heart sounds.   Pulmonary:      Effort: Pulmonary effort is normal.      Breath sounds: Normal breath sounds.   Abdominal:      General: Abdomen is flat. Bowel sounds are normal.      Palpations: Abdomen is soft.   Musculoskeletal:         General: Normal range of motion.      Cervical back: Normal range of motion and neck supple.   Feet:      Comments:      Skin:     General: Skin is warm and dry.   Neurological:      General: No focal deficit present.      Mental Status: She is alert and oriented to person, place, and time.   Psychiatric:         Mood and Affect: Mood normal.         Behavior: Behavior normal.         Thought Content: Thought content normal.         Judgment: Judgment normal.         Procedures      Assessment/Plan:   Diagnoses and all orders for this visit:    1. " Physical exam (Primary)  Assessment & Plan:  We discussed health maintenance, diet, exercise, and immunizations.    Orders:  -     Hemoglobin A1c; Future  -     Lipid Panel; Future  -     Comprehensive Metabolic Panel; Future  -     Vitamin B12; Future  -     Vitamin D,25-Hydroxy; Future  -     TSH Rfx On Abnormal To Free T4; Future  -     CBC & Differential; Future    2. Benign essential hypertension  Assessment & Plan:  Blood pressure has been doing well since she has been sick.  She is going to monitor it.    Orders:  -     Hemoglobin A1c; Future  -     Lipid Panel; Future  -     Comprehensive Metabolic Panel; Future  -     Vitamin B12; Future  -     Vitamin D,25-Hydroxy; Future  -     TSH Rfx On Abnormal To Free T4; Future  -     CBC & Differential; Future    3. Mixed hyperlipidemia  Assessment & Plan:  HDL 54.  LDL 84.  Triglycerides 190.  Recheck in 6 months.    Orders:  -     Hemoglobin A1c; Future  -     Lipid Panel; Future  -     Comprehensive Metabolic Panel; Future  -     Vitamin B12; Future  -     Vitamin D,25-Hydroxy; Future  -     TSH Rfx On Abnormal To Free T4; Future  -     CBC & Differential; Future  -     icosapent ethyl (VASCEPA) 1 g capsule capsule; Take 2 g by mouth 2 (Two) Times a Day With Meals.  Dispense: 360 capsule; Refill: 2    4. Acquired hypothyroidism  Assessment & Plan:  TSH is 2.710.  Recheck in 6 months.    Orders:  -     Hemoglobin A1c; Future  -     Lipid Panel; Future  -     Comprehensive Metabolic Panel; Future  -     Vitamin B12; Future  -     Vitamin D,25-Hydroxy; Future  -     TSH Rfx On Abnormal To Free T4; Future  -     CBC & Differential; Future    5. Type 2 diabetes mellitus without complication, without long-term current use of insulin  Assessment & Plan:  A1c is 7.3.  Recheck in 3 months.    Orders:  -     Hemoglobin A1c; Future  -     Lipid Panel; Future  -     Comprehensive Metabolic Panel; Future  -     Vitamin B12; Future  -     Vitamin D,25-Hydroxy; Future  -      TSH Rfx On Abnormal To Free T4; Future  -     CBC & Differential; Future    6. Vitamin D deficiency  Assessment & Plan:  Vitamin D is 53.4.  We will follow.    Orders:  -     Hemoglobin A1c; Future  -     Lipid Panel; Future  -     Comprehensive Metabolic Panel; Future  -     Vitamin B12; Future  -     Vitamin D,25-Hydroxy; Future  -     TSH Rfx On Abnormal To Free T4; Future  -     CBC & Differential; Future    7. Leukocytosis, unspecified type  Assessment & Plan:  White count is 12.0.  Recheck in 3 months.    Orders:  -     Hemoglobin A1c; Future  -     Lipid Panel; Future  -     Comprehensive Metabolic Panel; Future  -     Vitamin B12; Future  -     Vitamin D,25-Hydroxy; Future  -     TSH Rfx On Abnormal To Free T4; Future  -     CBC & Differential; Future    8. Adjustment disorder with mixed anxiety and depressed mood  -     LORazepam (Ativan) 0.5 MG tablet; Take 1 tablet by mouth Every 8 (Eight) Hours As Needed for Anxiety.  Dispense: 30 tablet; Refill: 2  -     POC Urine Drug Screen, Triage             Follow Up:   Return in about 3 months (around 9/23/2025) for Annual physical, Bloodwork 1 week prior to next appointment.    Healthcare Maintenance:   Counseling provided on health maintenance and immunizations.  Sigrid Holder voices understanding and acceptance of this advice and will call back with any further questions or concerns. AVS with preventive healthcare tips printed for patient.     Lopez Partida MD  [facility]

## 2025-06-23 ENCOUNTER — OFFICE VISIT (OUTPATIENT)
Dept: FAMILY MEDICINE CLINIC | Facility: CLINIC | Age: 65
End: 2025-06-23
Payer: OTHER GOVERNMENT

## 2025-06-23 VITALS
HEART RATE: 71 BPM | HEIGHT: 63 IN | BODY MASS INDEX: 23.83 KG/M2 | DIASTOLIC BLOOD PRESSURE: 62 MMHG | OXYGEN SATURATION: 98 % | WEIGHT: 134.5 LBS | SYSTOLIC BLOOD PRESSURE: 120 MMHG

## 2025-06-23 DIAGNOSIS — E55.9 VITAMIN D DEFICIENCY: ICD-10-CM

## 2025-06-23 DIAGNOSIS — E11.9 TYPE 2 DIABETES MELLITUS WITHOUT COMPLICATION, WITHOUT LONG-TERM CURRENT USE OF INSULIN: ICD-10-CM

## 2025-06-23 DIAGNOSIS — Z00.00 PHYSICAL EXAM: Primary | ICD-10-CM

## 2025-06-23 DIAGNOSIS — E78.2 MIXED HYPERLIPIDEMIA: ICD-10-CM

## 2025-06-23 DIAGNOSIS — F43.23 ADJUSTMENT DISORDER WITH MIXED ANXIETY AND DEPRESSED MOOD: ICD-10-CM

## 2025-06-23 DIAGNOSIS — I10 BENIGN ESSENTIAL HYPERTENSION: ICD-10-CM

## 2025-06-23 DIAGNOSIS — D72.829 LEUKOCYTOSIS, UNSPECIFIED TYPE: ICD-10-CM

## 2025-06-23 DIAGNOSIS — E03.9 ACQUIRED HYPOTHYROIDISM: ICD-10-CM

## 2025-06-23 LAB
POC AMPHETAMINES: NEGATIVE
POC BARBITURATES: NEGATIVE
POC BENZODIAZEPHINES: NEGATIVE
POC BUPRENORPHINE: NEGATIVE
POC COCAINE: NEGATIVE
POC METHADONE: NEGATIVE
POC METHAMPHETAMINE SCREEN URINE: NEGATIVE
POC OPIATES: NEGATIVE
POC OXYCODONE: NEGATIVE
POC PHENCYCLIDINE: NEGATIVE
POC PROPOXYPHENE: NEGATIVE
POC THC: NEGATIVE
POC TRICYCLIC ANTIDEPRESSANTS: NEGATIVE

## 2025-06-23 PROCEDURE — 99396 PREV VISIT EST AGE 40-64: CPT | Performed by: FAMILY MEDICINE

## 2025-06-23 PROCEDURE — 99214 OFFICE O/P EST MOD 30 MIN: CPT | Performed by: FAMILY MEDICINE

## 2025-06-23 RX ORDER — ICOSAPENT ETHYL 1 G/1
2 CAPSULE ORAL 2 TIMES DAILY WITH MEALS
Qty: 360 CAPSULE | Refills: 2 | Status: SHIPPED | OUTPATIENT
Start: 2025-06-23

## 2025-06-23 RX ORDER — LORAZEPAM 0.5 MG/1
0.5 TABLET ORAL EVERY 8 HOURS PRN
Qty: 30 TABLET | Refills: 2 | Status: SHIPPED | OUTPATIENT
Start: 2025-06-23

## (undated) DEVICE — CANN NASL CO2 TRULINK W/O2 A/

## (undated) DEVICE — VIAL FORMLN CAP 10PCT 20ML

## (undated) DEVICE — KT ORCA ORCAPOD DISP STRL

## (undated) DEVICE — JACKT LAB F/R KNIT CUFF/COLR XLG BLU

## (undated) DEVICE — FLEX ADVANTAGE 1500CC: Brand: FLEX ADVANTAGE

## (undated) DEVICE — SYRINGE, LUER SLIP, STERILE, 60ML: Brand: MEDLINE

## (undated) DEVICE — Device

## (undated) DEVICE — SINGLE-USE BIOPSY FORCEPS: Brand: RADIAL JAW 4

## (undated) DEVICE — GOWN ISOL W/THUMB UNIV BLU BX/15